# Patient Record
Sex: MALE | Race: WHITE | URBAN - METROPOLITAN AREA
[De-identification: names, ages, dates, MRNs, and addresses within clinical notes are randomized per-mention and may not be internally consistent; named-entity substitution may affect disease eponyms.]

---

## 2017-01-04 DIAGNOSIS — F33.1 MAJOR DEPRESSIVE DISORDER, RECURRENT, MODERATE (H): ICD-10-CM

## 2017-01-04 DIAGNOSIS — E78.5 HYPERLIPIDEMIA LDL GOAL <100: ICD-10-CM

## 2017-01-04 DIAGNOSIS — F41.1 ANXIETY STATE: Primary | ICD-10-CM

## 2017-01-04 RX ORDER — BUPROPION HYDROCHLORIDE 150 MG/1
TABLET ORAL
Qty: 30 TABLET | Refills: 0 | Status: SHIPPED | OUTPATIENT
Start: 2017-01-04 | End: 2017-01-11

## 2017-01-04 RX ORDER — ATORVASTATIN CALCIUM 40 MG/1
TABLET, FILM COATED ORAL
Qty: 30 TABLET | Refills: 0 | Status: SHIPPED | OUTPATIENT
Start: 2017-01-04 | End: 2017-01-11

## 2017-01-04 NOTE — TELEPHONE ENCOUNTER
Spoke with pt; future lab and OV appts scheduled.    Next 5 appointments (look out 90 days)     Jan 11, 2017  2:30 PM   Office Visit with Danika Hargrove MD   [facility] ([facility])    [address] [facility] [id number] [phone]                Medication is being filled for 1 time refill only due to:  upcoming appt   Joy CABALLERO RN

## 2017-01-04 NOTE — TELEPHONE ENCOUNTER
Lipitor      Last Written Prescription Date: 11/04/2016  Last Fill Quantity: 30, # refills: 1  Last Office Visit with [facility], [facility] or  Health prescribing provider: 11/04/2016       CHOL      165   10/28/2016  HDL       51   10/28/2016  LDL       91   10/28/2016  LDL      185   10/2/2013  TRIG      116   10/28/2016  CHOLHDLRATIO      3.8   6/30/2015    Wellbutrin        Last Written Prescription Date: 11/04/2016  Last Fill Quantity: 30; # refills: 1  Last Office Visit with [facility], [facility] or [facility] prescribing provider:  11/04/2016        Last PHQ-9 score on record=   PHQ-9 SCORE 11/4/2016   Total Score -   Total Score 1       AST       48   9/27/2011  ALT       88   9/27/2011

## 2017-01-06 DIAGNOSIS — R63.5 ABNORMAL WEIGHT GAIN: ICD-10-CM

## 2017-01-06 DIAGNOSIS — E78.5 HYPERLIPIDEMIA LDL GOAL <100: ICD-10-CM

## 2017-01-06 LAB
CHOLEST SERPL-MCNC: 170 MG/DL
HDLC SERPL-MCNC: 50 MG/DL
LDLC SERPL CALC-MCNC: 92 MG/DL
NONHDLC SERPL-MCNC: 120 MG/DL
TRIGL SERPL-MCNC: 141 MG/DL

## 2017-01-06 PROCEDURE — 36415 COLL VENOUS BLD VENIPUNCTURE: CPT | Performed by: FAMILY MEDICINE

## 2017-01-06 PROCEDURE — 80061 LIPID PANEL: CPT | Performed by: FAMILY MEDICINE

## 2017-01-11 ENCOUNTER — OFFICE VISIT (OUTPATIENT)
Dept: FAMILY MEDICINE | Facility: CLINIC | Age: 47
End: 2017-01-11
Payer: COMMERCIAL

## 2017-01-11 VITALS
SYSTOLIC BLOOD PRESSURE: 110 MMHG | HEIGHT: 74 IN | TEMPERATURE: 97.2 F | WEIGHT: 245 LBS | HEART RATE: 75 BPM | OXYGEN SATURATION: 96 % | BODY MASS INDEX: 31.44 KG/M2 | DIASTOLIC BLOOD PRESSURE: 60 MMHG

## 2017-01-11 DIAGNOSIS — F41.1 ANXIETY STATE: ICD-10-CM

## 2017-01-11 DIAGNOSIS — F33.1 MAJOR DEPRESSIVE DISORDER, RECURRENT, MODERATE (H): ICD-10-CM

## 2017-01-11 DIAGNOSIS — E78.5 HYPERLIPIDEMIA LDL GOAL <100: Primary | ICD-10-CM

## 2017-01-11 PROCEDURE — 99214 OFFICE O/P EST MOD 30 MIN: CPT | Performed by: FAMILY MEDICINE

## 2017-01-11 RX ORDER — BUPROPION HYDROCHLORIDE 150 MG/1
TABLET ORAL
Qty: 90 TABLET | Refills: 1 | Status: SHIPPED | OUTPATIENT
Start: 2017-01-11 | End: 2017-04-14

## 2017-01-11 RX ORDER — ATORVASTATIN CALCIUM 40 MG/1
TABLET, FILM COATED ORAL
Qty: 90 TABLET | Refills: 1 | Status: SHIPPED | OUTPATIENT
Start: 2017-01-11 | End: 2017-04-14

## 2017-01-11 ASSESSMENT — ANXIETY QUESTIONNAIRES
1. FEELING NERVOUS, ANXIOUS, OR ON EDGE: SEVERAL DAYS
6. BECOMING EASILY ANNOYED OR IRRITABLE: SEVERAL DAYS
5. BEING SO RESTLESS THAT IT IS HARD TO SIT STILL: NOT AT ALL
2. NOT BEING ABLE TO STOP OR CONTROL WORRYING: NOT AT ALL
7. FEELING AFRAID AS IF SOMETHING AWFUL MIGHT HAPPEN: NOT AT ALL
3. WORRYING TOO MUCH ABOUT DIFFERENT THINGS: NOT AT ALL
IF YOU CHECKED OFF ANY PROBLEMS ON THIS QUESTIONNAIRE, HOW DIFFICULT HAVE THESE PROBLEMS MADE IT FOR YOU TO DO YOUR WORK, TAKE CARE OF THINGS AT HOME, OR GET ALONG WITH OTHER PEOPLE: NOT DIFFICULT AT ALL
GAD7 TOTAL SCORE: 2

## 2017-01-11 ASSESSMENT — PATIENT HEALTH QUESTIONNAIRE - PHQ9: 5. POOR APPETITE OR OVEREATING: NOT AT ALL

## 2017-01-11 NOTE — PROGRESS NOTES
SUBJECTIVE:                                                    Rob Beltre is a 46 year old male who presents to clinic today for the following health issues:    Medication Followup of buPROPion 150 mg     Taking Medication as prescribed: yes    Side Effects:  None    Medication Helping Symptoms:  yes     CAD/Lipids-   Increased lipitor to 40mg from 20mg/d based on recent guideline recs, and cardiology had rec >70 LDL initially (though backed off on that rec).  Lipids with minimal to no change with the increased dose, however.  Odd.  Wt is up ~5-6 lbs, but pt thinks it's the scale- hasn't been up on home scale, though hasn't checked after holidays, but clothes fitting the same, feels the same.    Diet changes- eating more fruit.      Recent Labs   Lab Test  01/06/17   0807  10/28/16   1254  06/30/15   1704  02/14/14   0808   CHOL  170  165  169  162   HDL  50  51  45  39*   LDL  92  91  90  94   TRIG  141  116  169*  139   CHOLHDLRATIO   --    --   3.8  4.1        MDD/anxiety- added wellbutrin XL 150mg/d  Pt stopped the citalopram- had intended to have him continue on the citalopram and add wellbutrin XL, with possibility of stopping citalopram later.  With the change to wellbutrin (and off citalopram).  Feels like he's doing 'fine'.  Thinks about the same.  No se's.  Little more irritable, on-edge- attributed it to stress with dealing with family.  Libido is okay, no change- hadn't really felt like it was an issue before or now.  Little more irritable - bit more with wife.    More related to things that were going on with holidays, so unsure if it's more holidays or med change issue.  Will cont to monitor.      Patient Active Problem List   Diagnosis     Allergic rhinitis due to other allergen     Anxiety state     Major depressive disorder, recurrent, moderate (H)     Hyperlipidemia LDL goal <100     GERD (gastroesophageal reflux disease)     CAD (coronary artery disease)     Nonspecific abnormal  electrocardiogram (ECG) (EKG)     Past Surgical History   Procedure Laterality Date     No history of surgery       Cardiac echo ([facility])  7/12     JOHN Peoples, borderline concentric LVH, EF 55%       Social History   Substance Use Topics     Smoking status: Former Smoker     Quit date: 07/01/2012     Smokeless tobacco: Never Used      Comment: stopped after chest pain ER visit in 7/12     Alcohol Use: Yes      Comment: 1-2 drinks every few wks     Family History   Problem Relation Age of Onset     HEART DISEASE Father      MI around age 60     Allergies Father      Depression Mother      Genitourinary Problems Maternal Grandfather      Unsure of what     Gynecology Mother      Hysterectomy     Respiratory Maternal Grandfather      Breathing problems from farming     HEART DISEASE Paternal Grandfather      MI around 65     Lipids Father      Prostate Cancer Father          Current Outpatient Prescriptions   Medication Sig Dispense Refill     atorvastatin (LIPITOR) 40 MG tablet TAKE 1 TABLET (40 MG) BY MOUTH DAILY 90 tablet 1     buPROPion (WELLBUTRIN XL) 150 MG 24 hr tablet TAKE 1 TABLET (150 MG) BY MOUTH EVERY MORNING 90 tablet 1     amLODIPine (NORVASC) 5 MG tablet TAKE ONE TABLET BY MOUTH ONE TIME DAILY 90 tablet 1     aspirin 81 MG tablet Take 1 tablet by mouth daily. 90 tablet 3     Allergies   Allergen Reactions     Dust Mite Extract      Recent Labs   Lab Test  01/06/17   0807  10/28/16   1254  06/30/15   1704   09/27/11   0941  09/22/11   0837   05/14/09   0843   LDL  92  91  90   < >   --   134*   < >  138*   HDL  50  51  45   < >   --   42   < >  42   TRIG  141  116  169*   < >   --   133   < >  148   ALT   --    --    --    --   88*  132*   --   52   CR   --   0.94  1.02   < >   --    --    --    --    GFRESTIMATED   --   86  78   < >   --    --    --    --    GFRESTBLACK   --   >90   GFR Calc    >90   GFR Calc     < >   --    --    --    --    POTASSIUM   --   3.8  3.7   <  ">   --    --    --    --     < > = values in this interval not displayed.      BP Readings from Last 3 Encounters:   01/11/17 110/60   11/04/16 126/70   03/04/16 106/69    Wt Readings from Last 3 Encounters:   01/11/17 245 lb (111.131 kg)   11/04/16 239 lb 1.6 oz (108.455 kg)   03/04/16 238 lb (107.956 kg)                  Labs reviewed in EPIC  Problem list, Medication list, Allergies, and Medical/Social/Surgical histories reviewed in [facility] and updated as appropriate.    ROS:  Constitutional, HEENT, cardiovascular, pulmonary, gi and gu systems are negative, except as otherwise noted.    OBJECTIVE:                                                    /60 mmHg  Pulse 75  Temp(Src) 97.2  F (36.2  C) (Oral)  Ht 6' 1.75\" (1.873 m)  Wt 245 lb (111.131 kg)  BMI 31.68 kg/m2  SpO2 96%  Body mass index is 31.68 kg/(m^2).  GENERAL APPEARANCE: healthy, alert and no distress     EYES: PERRL, sclera clear     HENT: nose and mouth without ulcers or lesions     NECK: no adenopathy, no asymmetry, masses, or scars and thyroid normal to palpation     RESP: lungs clear to auscultation - no rales, rhonchi or wheezes     CV: regular rates and rhythm, normal S1 S2, no S3 or S4 and no murmur, click or rub      Abdomen: soft, nontender, no HSM or masses and bowel sounds normal     Ext: warm, dry, no edema      Psych: full range affect, normal speech and grooming, judgement and insight intact     Diagnostic Test Results:  Results for orders placed or performed in visit on 01/06/17   Lipid Profile with reflex to direct LDL   Result Value Ref Range    Cholesterol 170 <200 mg/dL    Triglycerides 141 <150 mg/dL    HDL Cholesterol 50 >39 mg/dL    LDL Cholesterol Calculated 92 <100 mg/dL    Non HDL Cholesterol 120 <130 mg/dL        ASSESSMENT/PLAN:                                                        ICD-10-CM    1. Hyperlipidemia LDL goal <100 E78.5 Lipid Profile with reflex to direct LDL     atorvastatin (LIPITOR) 40 MG tablet   2. " Major depressive disorder, recurrent, moderate (H) F33.1 buPROPion (WELLBUTRIN XL) 150 MG 24 hr tablet   3. Anxiety state F41.1 buPROPion (WELLBUTRIN XL) 150 MG 24 hr tablet     Lipids- cont 40mg lipitor, recheck fasting lipids prior to 3mo f/u appt- to recheck, odd to see minimal to no difference with dose increase since last time.    Pt will double-check dose on bottle at home as well.    MDD/Anxiety- feeling pretty much the same with the change from citalopram to wellbutrin - intended slower transition, but he did fine.  Switch mostly due to the wt gain, unsure if related to citalopram or not.  Now with mild wt gain on wellbutrin based on clinic scale. No se's other than possibly increased irritability.  Will cont to monitor sx's, rtc in 3 months to f/u.      Danika Hargrove MD  [facility]

## 2017-01-12 ASSESSMENT — ANXIETY QUESTIONNAIRES: GAD7 TOTAL SCORE: 2

## 2017-01-12 ASSESSMENT — PATIENT HEALTH QUESTIONNAIRE - PHQ9: SUM OF ALL RESPONSES TO PHQ QUESTIONS 1-9: 0

## 2017-03-26 DIAGNOSIS — F33.1 MAJOR DEPRESSIVE DISORDER, RECURRENT, MODERATE (H): ICD-10-CM

## 2017-03-26 DIAGNOSIS — F41.1 ANXIETY STATE: ICD-10-CM

## 2017-03-26 DIAGNOSIS — I25.10 CORONARY ARTERY DISEASE INVOLVING NATIVE CORONARY ARTERY OF NATIVE HEART WITHOUT ANGINA PECTORIS: ICD-10-CM

## 2017-03-26 DIAGNOSIS — E78.5 HYPERLIPIDEMIA LDL GOAL <100: ICD-10-CM

## 2017-03-27 RX ORDER — BUPROPION HYDROCHLORIDE 150 MG/1
TABLET ORAL
Start: 2017-03-27

## 2017-03-27 RX ORDER — ATORVASTATIN CALCIUM 40 MG/1
TABLET, FILM COATED ORAL
Start: 2017-03-27

## 2017-03-27 RX ORDER — AMLODIPINE BESYLATE 5 MG/1
TABLET ORAL
Start: 2017-03-27

## 2017-03-27 NOTE — TELEPHONE ENCOUNTER
Wellbutrin        Last Written Prescription Date: 01/11/2017  Last Fill Quantity: 90; # refills: 1  Last Office Visit with [facility] or  Health prescribing provider:  01/11/2017        Last PHQ-9 score on record=   PHQ-9 SCORE 1/11/2017   Total Score -   Total Score 0       Lab Results   Component Value Date    AST 48 09/27/2011     Lab Results   Component Value Date    ALT 88 09/27/2011     Lipitor      Last Written Prescription Date: 01/11/2017  Last Fill Quantity: 90, # refills: 1  Last Office Visit with [facility] or  Health prescribing provider: 01/11/2017       Lab Results   Component Value Date    CHOL 170 01/06/2017     Lab Results   Component Value Date    HDL 50 01/06/2017     Lab Results   Component Value Date    LDL 92 01/06/2017     Lab Results   Component Value Date    TRIG 141 01/06/2017     Lab Results   Component Value Date    CHOLHDLRATIO 3.8 06/30/2015     Norvasc       Last Written Prescription Date: 11/16/2016  Last Fill Quantity: 90, # refills: 1  Last Office Visit with [facility] or  Health prescribing provider: 01/11/2017       Potassium   Date Value Ref Range Status   10/28/2016 3.8 3.4 - 5.3 mmol/L Final     Creatinine   Date Value Ref Range Status   10/28/2016 0.94 0.66 - 1.25 mg/dL Final     BP Readings from Last 3 Encounters:   01/11/17 110/60   11/04/16 126/70   03/04/16 106/69

## 2017-04-07 DIAGNOSIS — E78.5 HYPERLIPIDEMIA LDL GOAL <100: ICD-10-CM

## 2017-04-07 PROCEDURE — 36415 COLL VENOUS BLD VENIPUNCTURE: CPT | Performed by: FAMILY MEDICINE

## 2017-04-07 PROCEDURE — 80061 LIPID PANEL: CPT | Performed by: FAMILY MEDICINE

## 2017-04-10 LAB
CHOLEST SERPL-MCNC: 136 MG/DL
HDLC SERPL-MCNC: 54 MG/DL
LDLC SERPL CALC-MCNC: 61 MG/DL
NONHDLC SERPL-MCNC: 82 MG/DL
TRIGL SERPL-MCNC: 105 MG/DL

## 2017-04-14 ENCOUNTER — OFFICE VISIT (OUTPATIENT)
Dept: FAMILY MEDICINE | Facility: CLINIC | Age: 47
End: 2017-04-14
Payer: COMMERCIAL

## 2017-04-14 VITALS
HEART RATE: 61 BPM | TEMPERATURE: 98.6 F | BODY MASS INDEX: 28.83 KG/M2 | HEIGHT: 74 IN | WEIGHT: 224.6 LBS | SYSTOLIC BLOOD PRESSURE: 101 MMHG | DIASTOLIC BLOOD PRESSURE: 66 MMHG | OXYGEN SATURATION: 96 %

## 2017-04-14 DIAGNOSIS — F33.1 MAJOR DEPRESSIVE DISORDER, RECURRENT, MODERATE (H): ICD-10-CM

## 2017-04-14 DIAGNOSIS — E78.5 HYPERLIPIDEMIA LDL GOAL <70: ICD-10-CM

## 2017-04-14 DIAGNOSIS — E78.5 HYPERLIPIDEMIA LDL GOAL <100: Primary | ICD-10-CM

## 2017-04-14 DIAGNOSIS — F41.1 ANXIETY STATE: ICD-10-CM

## 2017-04-14 DIAGNOSIS — I25.10 CORONARY ARTERY DISEASE INVOLVING NATIVE CORONARY ARTERY OF NATIVE HEART WITHOUT ANGINA PECTORIS: ICD-10-CM

## 2017-04-14 PROCEDURE — 99214 OFFICE O/P EST MOD 30 MIN: CPT | Performed by: FAMILY MEDICINE

## 2017-04-14 RX ORDER — ATORVASTATIN CALCIUM 40 MG/1
TABLET, FILM COATED ORAL
Qty: 90 TABLET | Refills: 1 | Status: SHIPPED | OUTPATIENT
Start: 2017-04-14 | End: 2017-11-19

## 2017-04-14 RX ORDER — AMLODIPINE BESYLATE 5 MG/1
5 TABLET ORAL DAILY
Qty: 90 TABLET | Refills: 1 | Status: SHIPPED | OUTPATIENT
Start: 2017-04-14 | End: 2017-11-19

## 2017-04-14 RX ORDER — BUPROPION HYDROCHLORIDE 150 MG/1
TABLET ORAL
Qty: 90 TABLET | Refills: 1 | Status: SHIPPED | OUTPATIENT
Start: 2017-04-14 | End: 2017-11-19

## 2017-04-14 ASSESSMENT — ANXIETY QUESTIONNAIRES
6. BECOMING EASILY ANNOYED OR IRRITABLE: NOT AT ALL
IF YOU CHECKED OFF ANY PROBLEMS ON THIS QUESTIONNAIRE, HOW DIFFICULT HAVE THESE PROBLEMS MADE IT FOR YOU TO DO YOUR WORK, TAKE CARE OF THINGS AT HOME, OR GET ALONG WITH OTHER PEOPLE: NOT DIFFICULT AT ALL
1. FEELING NERVOUS, ANXIOUS, OR ON EDGE: NOT AT ALL
GAD7 TOTAL SCORE: 0
5. BEING SO RESTLESS THAT IT IS HARD TO SIT STILL: NOT AT ALL
2. NOT BEING ABLE TO STOP OR CONTROL WORRYING: NOT AT ALL
3. WORRYING TOO MUCH ABOUT DIFFERENT THINGS: NOT AT ALL
7. FEELING AFRAID AS IF SOMETHING AWFUL MIGHT HAPPEN: NOT AT ALL

## 2017-04-14 ASSESSMENT — PATIENT HEALTH QUESTIONNAIRE - PHQ9: 5. POOR APPETITE OR OVEREATING: NOT AT ALL

## 2017-04-14 NOTE — MR AVS SNAPSHOT
"              After Visit Summary   4/14/2017    Rob Beltre    MRN: [id number]           Patient Information     Date Of Birth          1970        Visit Information        Provider Department      4/14/2017 2:00 PM Danika Hargrove MD [facility]        Today's Diagnoses     Hyperlipidemia LDL goal <100    -  1    Anxiety state        Major depressive disorder, recurrent, moderate (H)        Coronary artery disease involving native coronary artery of native heart without angina pectoris        Hyperlipidemia LDL goal <70          Care Instructions    7-minute work out.  Headspace meditation girish?        Follow-ups after your visit        Who to contact     If you have questions or need follow up information about today's clinic visit or your schedule please contact [facility] directly at [phone].  Normal or non-critical lab and imaging results will be communicated to you by MyChart, letter or phone within 4 business days after the clinic has received the results. If you do not hear from us within 7 days, please contact the clinic through [facility] or phone. If you have a critical or abnormal lab result, we will notify you by phone as soon as possible.  Submit refill requests through [employer] or call your pharmacy and they will forward the refill request to us. Please allow 3 business days for your refill to be completed.          Additional Information About Your Visit        MyChart Information     [employer] lets you send messages to your doctor, view your test results, renew your prescriptions, schedule appointments and more. To sign up, go to [email or web address] . Click on \"Log in\" on the left side of the screen, which will take you to the Welcome page. Then click on \"Sign up Now\" on the right side of the page.     You will be asked to enter the access code listed below, as well as some personal information. Please follow the directions to create your username and " "password.     Your access code is: [email or web address]  Expires: 2017  3:26 PM     Your access code will  in 90 days. If you need help or a new code, please call your [facility] or [phone].        Care EveryWhere ID     This is your Care EveryWhere ID. This could be used by other organizations to access your [facility] medical records  [id number]        Your Vitals Were     Pulse Temperature Height Pulse Oximetry BMI (Body Mass Index)       61 98.6  F (37  C) (Oral) 6' 1.75\" (1.873 m) 96% 29.03 kg/m2        Blood Pressure from Last 3 Encounters:   17 101/66   17 110/60   16 126/70    Weight from Last 3 Encounters:   17 224 lb 9.6 oz (101.9 kg)   17 245 lb (111.1 kg)   16 239 lb 1.6 oz (108.5 kg)              Today, you had the following     No orders found for display         Today's Medication Changes          These changes are accurate as of: 17 11:59 PM.  If you have any questions, ask your nurse or doctor.               These medicines have changed or have updated prescriptions.        Dose/Directions    amLODIPine 5 MG tablet   Commonly known as:  NORVASC   This may have changed:  See the new instructions.   Used for:  Coronary artery disease involving native coronary artery of native heart without angina pectoris   Changed by:  Danika Hargrove MD        Dose:  5 mg   Take 1 tablet (5 mg) by mouth daily   Quantity:  90 tablet   Refills:  1            Where to get your medicines      These medications were sent to [place] [address] [place] [facility] [id number] [place] [address]  [id number] [place] [address]     Phone:  [phone]     amLODIPine 5 MG tablet    atorvastatin 40 MG tablet    buPROPion 150 MG 24 hr tablet                Primary Care Provider Office Phone # Fax #    Danika Hargrove [phone]       [facility] [address] [address]        Thank you!     Thank you for " choosing [facility]  for your care. Our goal is always to provide you with excellent care. Hearing back from our patients is one way we can continue to improve our services. Please take a few minutes to complete the written survey that you may receive in the mail after your visit with us. Thank you!             Your Updated Medication List - Protect others around you: Learn how to safely use, store and throw away your medicines at www.disposemymeds.org.          This list is accurate as of: 4/14/17 11:59 PM.  Always use your most recent med list.                   Brand Name Dispense Instructions for use    amLODIPine 5 MG tablet    NORVASC    90 tablet    Take 1 tablet (5 mg) by mouth daily       aspirin 81 MG tablet     90 tablet    Take 1 tablet by mouth daily.       atorvastatin 40 MG tablet    LIPITOR    90 tablet    TAKE 1 TABLET (40 MG) BY MOUTH DAILY       buPROPion 150 MG 24 hr tablet    WELLBUTRIN XL    90 tablet    TAKE 1 TABLET (150 MG) BY MOUTH EVERY MORNING

## 2017-04-14 NOTE — PROGRESS NOTES
SUBJECTIVE:                                                    Rob Beltre is a 46 year old male who presents to clinic today for the following health issues:    Hyperlipidemia Follow-Up      Rate your low fat/cholesterol diet?: good    Taking statin?  Yes, no muscle aches from statin    Other lipid medications/supplements?:  none     Depression Followup    Status since last visit: Good    See PHQ-9 for current symptoms.  Other associated symptoms: None    Complicating factors:   Significant life event:  No   Current substance abuse:  None  Anxiety or Panic symptoms:  No    PHQ-9  English PHQ-9   Any Language          Amount of exercise or physical activity: Couple days-half hour to 45 mins    Problems taking medications regularly: No    Medication side effects: none    Diet: regular (no restrictions)    Back in 1/17, increased lipitor from 20mg to 40mg, but the LDL didn't change at all, which we discussed was odd at his last appt.  No meds change after last visit, but pt motivated to make significant changes in diet/exercise/wt.    Weight-   Lost ~20 lbs!  Down to 224 lbs- goal is to get down to 200 lbs.  Stopped eating sweets almost all together- special occasion only.  Really cut down on bread- ~1x/wk.  No soda  Lots of chicken, fish, salads, vegetables and fruits.  Snacks- almonds or clementines.  Humus with carrots and snow peas.  Not as many grains.    Over the last 3-4 wks, they started [employer] 3x/wk- more grains in that.  Haven't figured out how to pick meals they really want.  Portions are big- usually wife eats the left-overs.  One bad meal a week- likes [employer] food- gets naan and rice.  Mexican food-     Summer- wants to lose more, getting more active.      Mood- Just on the wellbutrin XL for the mood.  Compared to the citalopram- doing well for the depression sx's.  Feels more emotional, closer to the surface, embarrassing more than anything.  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    Vit D-  forgot to start.    Exercise- some walking now.      Last 12/15, hit left ankle with shoe, and still swollen and discolored.  Rec exercises.    Problem list and histories reviewed & adjusted, as indicated.  Additional history: as documented    Patient Active Problem List   Diagnosis     Allergic rhinitis due to other allergen     Anxiety state     Major depressive disorder, recurrent, moderate (H)     Hyperlipidemia LDL goal <100     GERD (gastroesophageal reflux disease)     CAD (coronary artery disease)     Nonspecific abnormal electrocardiogram (ECG) (EKG)     Past Surgical History:   Procedure Laterality Date     CARDIAC ECHO ([facility])  7/12    JOHN Peoples, borderline concentric LVH, EF 55%     NO HISTORY OF SURGERY         Social History   Substance Use Topics     Smoking status: Former Smoker     Quit date: 7/1/2012     Smokeless tobacco: Never Used      Comment: stopped after chest pain ER visit in 7/12     Alcohol use Yes      Comment: 1-2 drinks every few wks     Family History   Problem Relation Age of Onset     HEART DISEASE Father      MI around age 60     Allergies Father      Depression Mother      Genitourinary Problems Maternal Grandfather      Unsure of what     Gynecology Mother      Hysterectomy     Respiratory Maternal Grandfather      Breathing problems from farming     HEART DISEASE Paternal Grandfather      MI around 65     Lipids Father      Prostate Cancer Father          Current Outpatient Prescriptions   Medication Sig Dispense Refill     atorvastatin (LIPITOR) 40 MG tablet TAKE 1 TABLET (40 MG) BY MOUTH DAILY 90 tablet 1     buPROPion (WELLBUTRIN XL) 150 MG 24 hr tablet TAKE 1 TABLET (150 MG) BY MOUTH EVERY MORNING 90 tablet 1     amLODIPine (NORVASC) 5 MG tablet Take 1 tablet (5 mg) by mouth daily 90 tablet 1     aspirin 81 MG tablet Take 1 tablet by mouth daily. 90 tablet 3     Allergies   Allergen Reactions     Dust Mite Extract      Recent Labs   Lab Test  04/07/17   1548  01/06/17    "0807  10/28/16   1254  06/30/15   1704   09/27/11   0941  09/22/11   0837   05/14/09   0843   LDL  61  92  91  90   < >   --   134*   < >  138*   HDL  54  50  51  45   < >   --   42   < >  42   TRIG  105  141  116  169*   < >   --   133   < >  148   ALT   --    --    --    --    --   88*  132*   --   52   CR   --    --   0.94  1.02   < >   --    --    --    --    GFRESTIMATED   --    --   86  78   < >   --    --    --    --    GFRESTBLACK   --    --   >90   GFR Calc    >90   GFR Calc     < >   --    --    --    --    POTASSIUM   --    --   3.8  3.7   < >   --    --    --    --     < > = values in this interval not displayed.      BP Readings from Last 3 Encounters:   04/14/17 101/66   01/11/17 110/60   11/04/16 126/70    Wt Readings from Last 3 Encounters:   04/14/17 224 lb 9.6 oz (101.9 kg)   01/11/17 245 lb (111.1 kg)   11/04/16 239 lb 1.6 oz (108.5 kg)               Labs reviewed in EPIC    Reviewed and updated as needed this visit by clinical staff  Tobacco  Allergies  Meds  Problems       Reviewed and updated as needed this visit by Provider  Allergies  Meds  Problems         ROS:  Constitutional, HEENT, cardiovascular, pulmonary, gi and gu systems are negative, except as otherwise noted.    OBJECTIVE:                                                    /66  Pulse 61  Temp 98.6  F (37  C) (Oral)  Ht 6' 1.75\" (1.873 m)  Wt 224 lb 9.6 oz (101.9 kg)  SpO2 96%  BMI 29.03 kg/m2  Body mass index is 29.03 kg/(m^2).  GENERAL APPEARANCE: healthy, alert and no distress     EYES: sclera clear, EOMI     RESP: lungs clear to auscultation - no rales, rhonchi or wheezes     CV: regular rates and rhythm, normal S1 S2, no S3 or S4 and no murmur, click or rub      Ext: warm, dry, no edema       Diagnostic Test Results:  none      ASSESSMENT/PLAN:                                                        ICD-10-CM    1. Hyperlipidemia LDL goal <100 E78.5 atorvastatin (LIPITOR) 40 MG " tablet   2. Anxiety state F41.1 buPROPion (WELLBUTRIN XL) 150 MG 24 hr tablet   3. Major depressive disorder, recurrent, moderate (H) F33.1 buPROPion (WELLBUTRIN XL) 150 MG 24 hr tablet   4. Coronary artery disease involving native coronary artery of native heart without angina pectoris I25.10 amLODIPine (NORVASC) 5 MG tablet     Lipids/CAD-   Pt doing GREAT with diet/exercise/wt loss after getting motivated with lack of improvement in LDL at last visit despite increase in lipitor.  Really working on diet/exercise.  Now still on lipitor 40mg/d, but with the wt loss and improved habits, LDL fell from the 90s to 61 (LDL goal <100).  Commended him on his great work.  He still wants to lose more - hoping to do so with better activity this summer.  Also rec 7-minute work-out.    MDD- wellbutrin XL working well, except that he feels more emotional, closer to surface, tearing up a bit more.  Would like to keep meds the same for now, though.  Had meant to keep him on both the citalopram and add the wellbutrin at his 1/17 visit, but pt thought he was to stop the citalopram and do the wellbutrin alone.  Will consider adding back the citalopram if needed in future.    Meds sent x 6mo- f/u with physical at that time.      Danika Hargrove MD  [facility]

## 2017-04-15 ASSESSMENT — ANXIETY QUESTIONNAIRES: GAD7 TOTAL SCORE: 0

## 2017-04-15 ASSESSMENT — PATIENT HEALTH QUESTIONNAIRE - PHQ9: SUM OF ALL RESPONSES TO PHQ QUESTIONS 1-9: 0

## 2017-09-13 ENCOUNTER — OFFICE VISIT (OUTPATIENT)
Dept: FAMILY MEDICINE | Facility: CLINIC | Age: 47
End: 2017-09-13
Payer: COMMERCIAL

## 2017-09-13 VITALS
HEIGHT: 74 IN | TEMPERATURE: 98 F | BODY MASS INDEX: 27.53 KG/M2 | HEART RATE: 64 BPM | SYSTOLIC BLOOD PRESSURE: 114 MMHG | WEIGHT: 214.5 LBS | DIASTOLIC BLOOD PRESSURE: 68 MMHG | RESPIRATION RATE: 16 BRPM

## 2017-09-13 DIAGNOSIS — M54.50 ACUTE RIGHT-SIDED LOW BACK PAIN WITHOUT SCIATICA: Primary | ICD-10-CM

## 2017-09-13 PROCEDURE — 99214 OFFICE O/P EST MOD 30 MIN: CPT | Performed by: FAMILY MEDICINE

## 2017-09-13 RX ORDER — CYCLOBENZAPRINE HCL 10 MG
5-10 TABLET ORAL 3 TIMES DAILY PRN
Qty: 30 TABLET | Refills: 1 | Status: SHIPPED | OUTPATIENT
Start: 2017-09-13 | End: 2022-03-04

## 2017-09-13 NOTE — NURSING NOTE
"Chief Complaint   Patient presents with     Back Pain     /68  Pulse 64  Temp 98  F (36.7  C) (Oral)  Resp 16  Ht 6' 1.75\" (1.873 m)  Wt 214 lb 8 oz (97.3 kg)  BMI 27.73 kg/m2 Estimated body mass index is 27.73 kg/(m^2) as calculated from the following:    Height as of this encounter: 6' 1.75\" (1.873 m).    Weight as of this encounter: 214 lb 8 oz (97.3 kg).  Medication Reconciliation: complete      Health Maintenance due pending provider review:  NONE    n/a    Kym Guerrero CMA  "

## 2017-09-13 NOTE — PROGRESS NOTES
SUBJECTIVE:   Rob Beltre is a 46 year old male who presents to clinic today for the following health issues:    Back Pain     Duration: intermittent spasms on lower back, ~4 days ago        Specific cause: none    Description:   Location of pain: low back all over  Character of pain: cramping and tightening, worse with more walking  Pain radiation: radiated up into whole back  New numbness or weakness in legs, not attributed to pain:  YES- when it really got tight    Intensity: moderate    History:   Pain interferes with job: not really, but still present  History of back problems: no prior back problems, few incidents of some tightening previously  Any previous MRI or X-rays: None  Sees a specialist for back pain:  No  Therapies tried without relief: rest and NSAIDs, but took longer than usual to subside    Alleviating factors:   Improved by: rest and NSAIDs, heat     Precipitating factors:  Worsened by: unknown    Functional and Psychosocial Screen (Margareth STarT Back):      Not performed today      Accompanying Signs & Symptoms:  Risk of Fracture:  None  Risk of Cauda Equina:  None  Risk of Infection:  None  Risk of Cancer:  None  Risk of Ankylosing Spondylitis:  Onset at age <35, male, AND morning back stiffness. no     Has gotten intermittent spasms on lower back.    ~4 days ago, while walking at an [employer], low back started tightening up.  Kept getting worse and worse, to the point he couldn't move his leg.  At [employer], walking, kept getting worse.  Eventually had to leave.  While driving home, his back tightened up so badly he had to pull over and he couldn't move for awhile.  After 5-10 minutes, was able to get out and into back seat.  Sat in care leaning to the right -most comfortable position.  Got home- got into house, took advil, lied down on floor, tried stretching.  Started to feel better.  Lied around for rest of the day.      Started to feel better in back of car and after resting at home, but  never went away.  Still there at the lower level of pain- consistent, can always feel it.  Sx's staying about the same.  Can go to work, look behind shoulder.  If sitting longer, starts feeling it as well in upper back.  Also feels tight in the rest of his body as well.    Taking advil and applying heat to back seems to help- heated seats, shower, lying on floor to stretch helps as well.    Has had back pain in past, but not this bad, and always improved after a day or two.      Mostly worried because he and his wife are going to [place] in a week-   His wife is really worried what she'll be able to do for him if sx's are bad there.      Problem list and histories reviewed & adjusted, as indicated.  Additional history: as documented    Patient Active Problem List   Diagnosis     Allergic rhinitis due to other allergen     Anxiety state     Major depressive disorder, recurrent, moderate (H)     Hyperlipidemia LDL goal <100     GERD (gastroesophageal reflux disease)     CAD (coronary artery disease)     Nonspecific abnormal electrocardiogram (ECG) (EKG)      Current Outpatient Prescriptions   Medication Sig Dispense Refill     cyclobenzaprine (FLEXERIL) 10 MG tablet Take 0.5-1 tablets (5-10 mg) by mouth 3 times daily as needed for muscle spasms 30 tablet 1     atorvastatin (LIPITOR) 40 MG tablet TAKE 1 TABLET (40 MG) BY MOUTH DAILY 90 tablet 1     buPROPion (WELLBUTRIN XL) 150 MG 24 hr tablet TAKE 1 TABLET (150 MG) BY MOUTH EVERY MORNING 90 tablet 1     amLODIPine (NORVASC) 5 MG tablet Take 1 tablet (5 mg) by mouth daily 90 tablet 1     aspirin 81 MG tablet Take 1 tablet by mouth daily. 90 tablet 3     Allergies   Allergen Reactions     Dust Mite Extract      BP Readings from Last 3 Encounters:   09/13/17 114/68   04/14/17 101/66   01/11/17 110/60    Wt Readings from Last 3 Encounters:   09/13/17 214 lb 8 oz (97.3 kg)   04/14/17 224 lb 9.6 oz (101.9 kg)   01/11/17 245 lb (111.1 kg)           Labs reviewed in  "EPIC      Reviewed and updated as needed this visit by clinical staff  Tobacco  Allergies  Meds  Problems  Med Hx  Surg Hx  Fam Hx  Soc Hx        Reviewed and updated as needed this visit by Provider  Allergies  Meds  Problems       ROS:  Constitutional, HEENT, cardiovascular, pulmonary, gi and gu systems are negative, except as otherwise noted.      OBJECTIVE:   /68  Pulse 64  Temp 98  F (36.7  C) (Oral)  Resp 16  Ht 6' 1.75\" (1.873 m)  Wt 214 lb 8 oz (97.3 kg)  BMI 27.73 kg/m2  Body mass index is 27.73 kg/(m^2).   GENERAL APPEARANCE: healthy, alert and no distress     EYES: sclera clear, EOMI     RESP: lungs clear to auscultation - no rales, rhonchi or wheezes     CV: regular rates and rhythm, normal S1 S2, no S3 or S4 and no murmur, click or rub      Ext: warm, dry, no edema    Comprehensive back pain exam:  Tenderness of right lumbar paraspinous muscles, Range of motion not limited by pain, Lower extremity strength functional and equal on both sides, Lower extremity reflexes within normal limits bilaterally, Lower extremity sensation normal and equal on both sides and Straight leg raise negative bilaterally    Diagnostic Test Results:  none     ASSESSMENT/PLAN:         ICD-10-CM    1. Acute right-sided low back pain without sciatica M54.5 cyclobenzaprine (FLEXERIL) 10 MG tablet     45yo pt with occasional mild low back muscle spasm flares, now with his worst.  Sx's improved after 1-2 hrs, but has had persistent mild pain for a few days since.  Worried about upcoming flight/trip to [place].  Will do flexeril in case of worsening there.  Risks and benefits of medication(s) including potential side effects reviewed with patient.  Questions answered.   Also discussed alternating ibuprofen/tylenol for pain relief.  Call for PT if sx's persisted.  Pt agrees with plan.    Danika Hargrove MD  [facility]    "

## 2017-09-13 NOTE — MR AVS SNAPSHOT
"              After Visit Summary   2017    Rob Beltre    MRN: [id number]           Patient Information     Date Of Birth          1970        Visit Information        Provider Department      2017 10:30 AM Danika Hargrove MD [facility]        Today's Diagnoses     Acute right-sided low back pain without sciatica    -  1       Follow-ups after your visit        Who to contact     If you have questions or need follow up information about today's clinic visit or your schedule please contact [facility] directly at [phone].  Normal or non-critical lab and imaging results will be communicated to you by [employer], letter or phone within 4 business days after the clinic has received the results. If you do not hear from us within 7 days, please contact the clinic through [employer] or phone. If you have a critical or abnormal lab result, we will notify you by phone as soon as possible.  Submit refill requests through [employer] or call your pharmacy and they will forward the refill request to us. Please allow 3 business days for your refill to be completed.          Additional Information About Your Visit        MyChart Information     [employer] lets you send messages to your doctor, view your test results, renew your prescriptions, schedule appointments and more. To sign up, go to [email or web address] . Click on \"Log in\" on the left side of the screen, which will take you to the Welcome page. Then click on \"Sign up Now\" on the right side of the page.     You will be asked to enter the access code listed below, as well as some personal information. Please follow the directions to create your username and password.     Your access code is: [email or web address]  Expires: 2017  2:42 PM     Your access code will  in 90 days. If you need help or a new code, please call your [facility] or [phone].        Care EveryWhere ID     This is your Care EveryWhere ID. This " "could be used by other organizations to access your [facility] medical records  [id number]        Your Vitals Were     Pulse Temperature Respirations Height BMI (Body Mass Index)       64 98  F (36.7  C) (Oral) 16 6' 1.75\" (1.873 m) 27.73 kg/m2        Blood Pressure from Last 3 Encounters:   09/13/17 114/68   04/14/17 101/66   01/11/17 110/60    Weight from Last 3 Encounters:   09/13/17 214 lb 8 oz (97.3 kg)   04/14/17 224 lb 9.6 oz (101.9 kg)   01/11/17 245 lb (111.1 kg)              Today, you had the following     No orders found for display         Today's Medication Changes          These changes are accurate as of: 9/13/17 11:59 PM.  If you have any questions, ask your nurse or doctor.               Start taking these medicines.        Dose/Directions    cyclobenzaprine 10 MG tablet   Commonly known as:  FLEXERIL   Used for:  Acute right-sided low back pain without sciatica   Started by:  Danika Hargrove MD        Dose:  5-10 mg   Take 0.5-1 tablets (5-10 mg) by mouth 3 times daily as needed for muscle spasms   Quantity:  30 tablet   Refills:  1            Where to get your medicines      These medications were sent to [facility] [id number] [place], [place] - [address]  [address] [facility] [address]     Phone:  [phone]     cyclobenzaprine 10 MG tablet                Primary Care Provider Office Phone # Fax #    Danika Hargrove [phone] [address] [address]        Equal Access to Services     [facility] : Hadchetan Alvarado, richie gutierrez, qaybcara kaalkwame dillon. So [facility] [phone].    ATENCIÓN: Si habla español, tiene a conte disposición servicios gratuitos de asistencia lingüística. Llame al [phone].    We comply with applicable federal civil rights laws and [place] laws. We do not discriminate on the basis of race, color, national origin, age, " disability sex, sexual orientation or gender identity.            Thank you!     Thank you for choosing [facility]  for your care. Our goal is always to provide you with excellent care. Hearing back from our patients is one way we can continue to improve our services. Please take a few minutes to complete the written survey that you may receive in the mail after your visit with us. Thank you!             Your Updated Medication List - Protect others around you: Learn how to safely use, store and throw away your medicines at www.disposemymeds.org.          This list is accurate as of: 9/13/17 11:59 PM.  Always use your most recent med list.                   Brand Name Dispense Instructions for use Diagnosis    amLODIPine 5 MG tablet    NORVASC    90 tablet    Take 1 tablet (5 mg) by mouth daily    Coronary artery disease involving native coronary artery of native heart without angina pectoris       aspirin 81 MG tablet     90 tablet    Take 1 tablet by mouth daily.    CAD (coronary artery disease)       atorvastatin 40 MG tablet    LIPITOR    90 tablet    TAKE 1 TABLET (40 MG) BY MOUTH DAILY    Hyperlipidemia LDL goal <100       buPROPion 150 MG 24 hr tablet    WELLBUTRIN XL    90 tablet    TAKE 1 TABLET (150 MG) BY MOUTH EVERY MORNING    Anxiety state, Major depressive disorder, recurrent, moderate (H)       cyclobenzaprine 10 MG tablet    FLEXERIL    30 tablet    Take 0.5-1 tablets (5-10 mg) by mouth 3 times daily as needed for muscle spasms    Acute right-sided low back pain without sciatica

## 2017-11-19 DIAGNOSIS — I25.10 CORONARY ARTERY DISEASE INVOLVING NATIVE CORONARY ARTERY OF NATIVE HEART WITHOUT ANGINA PECTORIS: ICD-10-CM

## 2017-11-19 DIAGNOSIS — E78.5 HYPERLIPIDEMIA LDL GOAL <100: ICD-10-CM

## 2017-11-19 DIAGNOSIS — F41.1 ANXIETY STATE: ICD-10-CM

## 2017-11-19 DIAGNOSIS — F33.1 MAJOR DEPRESSIVE DISORDER, RECURRENT, MODERATE (H): ICD-10-CM

## 2017-11-20 RX ORDER — ATORVASTATIN CALCIUM 40 MG/1
TABLET, FILM COATED ORAL
Qty: 30 TABLET | Refills: 0 | Status: SHIPPED | OUTPATIENT
Start: 2017-11-20 | End: 2017-12-13

## 2017-11-20 RX ORDER — BUPROPION HYDROCHLORIDE 150 MG/1
TABLET ORAL
Qty: 30 TABLET | Refills: 0 | Status: SHIPPED | OUTPATIENT
Start: 2017-11-20 | End: 2017-12-13

## 2017-11-20 RX ORDER — AMLODIPINE BESYLATE 5 MG/1
TABLET ORAL
Qty: 30 TABLET | Refills: 0 | Status: SHIPPED | OUTPATIENT
Start: 2017-11-20 | End: 2017-12-13

## 2017-11-20 NOTE — TELEPHONE ENCOUNTER
Medication is being filled for 1 time refill only due to:  Patient needs to be seen because Due for physical.   Note from 4/14/17 OV:  Meds sent x 6mo- f/u with physical at that time.  Marion Collins RN    Wellbutrin       Last Written Prescription Date: 4/14/2017  Last Fill Quantity: 90; # refills: 1  Last Office Visit with [facility] or [place] prescribing provider:  4/14/2017        Last PHQ-9 score on record=   PHQ-9 SCORE 4/14/2017   Total Score -   Total Score 0       Lab Results   Component Value Date    AST 48 09/27/2011     Lab Results   Component Value Date    ALT 88 09/27/2011               Norvasc     Last Written Prescription Date: 4/14/2017  Last Fill Quantity: 90, # refills: 1  Last Office Visit with [facility] or [facility] prescribing provider: 4/14/2017       Potassium   Date Value Ref Range Status   10/28/2016 3.8 3.4 - 5.3 mmol/L Final     Creatinine   Date Value Ref Range Status   10/28/2016 0.94 0.66 - 1.25 mg/dL Final     BP Readings from Last 3 Encounters:   09/13/17 114/68   04/14/17 101/66   01/11/17 110/60         Atorvastatin     Last Written Prescription Date: 4/14/2017  Last Fill Quantity: 90, # refills: 1  Last Office Visit with [facility], [facility] or [facility] prescribing provider: 4/14/2017       Lab Results   Component Value Date    CHOL 136 04/07/2017     Lab Results   Component Value Date    HDL 54 04/07/2017     Lab Results   Component Value Date    LDL 61 04/07/2017     Lab Results   Component Value Date    TRIG 105 04/07/2017     Lab Results   Component Value Date    CHOLHDLRATIO 3.8 06/30/2015

## 2017-12-01 DIAGNOSIS — F33.1 MAJOR DEPRESSIVE DISORDER, RECURRENT, MODERATE (H): ICD-10-CM

## 2017-12-01 DIAGNOSIS — F41.1 ANXIETY STATE: ICD-10-CM

## 2017-12-01 DIAGNOSIS — I25.10 CORONARY ARTERY DISEASE INVOLVING NATIVE CORONARY ARTERY OF NATIVE HEART WITHOUT ANGINA PECTORIS: ICD-10-CM

## 2017-12-04 RX ORDER — BUPROPION HYDROCHLORIDE 150 MG/1
TABLET ORAL
Start: 2017-12-04

## 2017-12-04 RX ORDER — AMLODIPINE BESYLATE 5 MG/1
TABLET ORAL
Start: 2017-12-04

## 2017-12-04 NOTE — TELEPHONE ENCOUNTER
Denied both requests  Refill requests too early; Rx sent 11/20/17 for 30 days  Joy CABALLERO RN      Requested Prescriptions   Pending Prescriptions Disp Refills     amLODIPine (NORVASC) 5 MG tablet [Pharmacy Med Name: AMLODIPINE BESYLATE 5 MG TAB] 90 tablet 1     Sig: TAKE 1 TABLET (5 MG) BY MOUTH DAILY    Calcium Channel Blockers Protocol  Failed    12/4/2017  9:46 AM       Failed - Normal serum creatinine on file in past 12 months    Recent Labs   Lab Test  10/28/16   1254   CR  0.94            Passed - Blood pressure under 140/90    BP Readings from Last 3 Encounters:   09/13/17 114/68   04/14/17 101/66   01/11/17 110/60                Passed - Recent or future visit with authorizing provider    Patient had office visit in the last year or has a visit in the next 30 days with authorizing provider.  See chart review.              Passed - Patient is age 18 or older        buPROPion (WELLBUTRIN XL) 150 MG 24 hr tablet [Pharmacy Med Name: BUPROPION HCL  MG TABLET] 90 tablet 1     Sig: TAKE 1 TABLET (150 MG) BY MOUTH EVERY MORNING    SSRIs Protocol Failed    12/4/2017  9:46 AM       Failed - PHQ-9 score less than 5 in past 6 months    Please review PHQ-9 score.          Failed - Medication is NOT Bupropion    If the medication is Bupropion (Wellbutrin), and the patient is taking for smoking cessation; OK to refill.         Passed - Patient is age 18 or older       Passed - Recent (6 mo) or future visit with authorizing provider's specialty    Patient had office visit in the last 6 months or has a visit in the next 30 days with authorizing provider.  See chart review.             Next 5 appointments (look out 90 days)     Dec 13, 2017  8:00 AM CST   Office Visit with Danika Hargrove MD   [facility] ([facility])    [address] [address] [phone]

## 2017-12-13 ENCOUNTER — OFFICE VISIT (OUTPATIENT)
Dept: FAMILY MEDICINE | Facility: CLINIC | Age: 47
End: 2017-12-13
Payer: COMMERCIAL

## 2017-12-13 VITALS
HEART RATE: 64 BPM | DIASTOLIC BLOOD PRESSURE: 66 MMHG | TEMPERATURE: 97.4 F | OXYGEN SATURATION: 96 % | WEIGHT: 213.2 LBS | BODY MASS INDEX: 27.36 KG/M2 | SYSTOLIC BLOOD PRESSURE: 104 MMHG | HEIGHT: 74 IN

## 2017-12-13 DIAGNOSIS — F41.1 ANXIETY STATE: ICD-10-CM

## 2017-12-13 DIAGNOSIS — Z23 NEED FOR TDAP VACCINATION: ICD-10-CM

## 2017-12-13 DIAGNOSIS — R63.4 WEIGHT LOSS: ICD-10-CM

## 2017-12-13 DIAGNOSIS — E78.5 HYPERLIPIDEMIA LDL GOAL <100: ICD-10-CM

## 2017-12-13 DIAGNOSIS — F33.1 MAJOR DEPRESSIVE DISORDER, RECURRENT, MODERATE (H): ICD-10-CM

## 2017-12-13 DIAGNOSIS — Z00.00 ENCOUNTER FOR ROUTINE ADULT HEALTH EXAMINATION WITHOUT ABNORMAL FINDINGS: Primary | ICD-10-CM

## 2017-12-13 DIAGNOSIS — I25.10 CORONARY ARTERY DISEASE INVOLVING NATIVE CORONARY ARTERY OF NATIVE HEART WITHOUT ANGINA PECTORIS: ICD-10-CM

## 2017-12-13 LAB
ANION GAP SERPL CALCULATED.3IONS-SCNC: 11 MMOL/L (ref 3–14)
BUN SERPL-MCNC: 20 MG/DL (ref 7–30)
CALCIUM SERPL-MCNC: 9.5 MG/DL (ref 8.5–10.1)
CHLORIDE SERPL-SCNC: 104 MMOL/L (ref 94–109)
CHOLEST SERPL-MCNC: 148 MG/DL
CO2 SERPL-SCNC: 24 MMOL/L (ref 20–32)
CREAT SERPL-MCNC: 1.04 MG/DL (ref 0.66–1.25)
CREAT UR-MCNC: 236 MG/DL
GFR SERPL CREATININE-BSD FRML MDRD: 76 ML/MIN/1.7M2
GLUCOSE SERPL-MCNC: 81 MG/DL (ref 70–99)
HDLC SERPL-MCNC: 55 MG/DL
LDLC SERPL CALC-MCNC: 73 MG/DL
MICROALBUMIN UR-MCNC: 8 MG/L
MICROALBUMIN/CREAT UR: 3.48 MG/G CR (ref 0–17)
NONHDLC SERPL-MCNC: 93 MG/DL
POTASSIUM SERPL-SCNC: 4 MMOL/L (ref 3.4–5.3)
SODIUM SERPL-SCNC: 139 MMOL/L (ref 133–144)
TRIGL SERPL-MCNC: 100 MG/DL

## 2017-12-13 PROCEDURE — 80061 LIPID PANEL: CPT | Performed by: FAMILY MEDICINE

## 2017-12-13 PROCEDURE — 82043 UR ALBUMIN QUANTITATIVE: CPT | Performed by: FAMILY MEDICINE

## 2017-12-13 PROCEDURE — 99213 OFFICE O/P EST LOW 20 MIN: CPT | Mod: 25 | Performed by: FAMILY MEDICINE

## 2017-12-13 PROCEDURE — 99396 PREV VISIT EST AGE 40-64: CPT | Mod: 25 | Performed by: FAMILY MEDICINE

## 2017-12-13 PROCEDURE — 90715 TDAP VACCINE 7 YRS/> IM: CPT | Performed by: FAMILY MEDICINE

## 2017-12-13 PROCEDURE — 90471 IMMUNIZATION ADMIN: CPT | Performed by: FAMILY MEDICINE

## 2017-12-13 PROCEDURE — 80048 BASIC METABOLIC PNL TOTAL CA: CPT | Performed by: FAMILY MEDICINE

## 2017-12-13 PROCEDURE — 36415 COLL VENOUS BLD VENIPUNCTURE: CPT | Performed by: FAMILY MEDICINE

## 2017-12-13 RX ORDER — ATORVASTATIN CALCIUM 40 MG/1
TABLET, FILM COATED ORAL
Qty: 90 TABLET | Refills: 1 | Status: SHIPPED | OUTPATIENT
Start: 2017-12-13 | End: 2018-01-24

## 2017-12-13 RX ORDER — BUPROPION HYDROCHLORIDE 150 MG/1
TABLET ORAL
Qty: 90 TABLET | Refills: 1 | Status: SHIPPED | OUTPATIENT
Start: 2017-12-13 | End: 2018-01-24

## 2017-12-13 RX ORDER — AMLODIPINE BESYLATE 5 MG/1
TABLET ORAL
Qty: 90 TABLET | Refills: 1 | Status: SHIPPED | OUTPATIENT
Start: 2017-12-13 | End: 2018-01-24

## 2017-12-13 ASSESSMENT — PATIENT HEALTH QUESTIONNAIRE - PHQ9
5. POOR APPETITE OR OVEREATING: NOT AT ALL
SUM OF ALL RESPONSES TO PHQ QUESTIONS 1-9: 3

## 2017-12-13 ASSESSMENT — ANXIETY QUESTIONNAIRES
IF YOU CHECKED OFF ANY PROBLEMS ON THIS QUESTIONNAIRE, HOW DIFFICULT HAVE THESE PROBLEMS MADE IT FOR YOU TO DO YOUR WORK, TAKE CARE OF THINGS AT HOME, OR GET ALONG WITH OTHER PEOPLE: NOT DIFFICULT AT ALL
1. FEELING NERVOUS, ANXIOUS, OR ON EDGE: SEVERAL DAYS
6. BECOMING EASILY ANNOYED OR IRRITABLE: SEVERAL DAYS
5. BEING SO RESTLESS THAT IT IS HARD TO SIT STILL: NOT AT ALL
3. WORRYING TOO MUCH ABOUT DIFFERENT THINGS: SEVERAL DAYS
GAD7 TOTAL SCORE: 3
2. NOT BEING ABLE TO STOP OR CONTROL WORRYING: NOT AT ALL
7. FEELING AFRAID AS IF SOMETHING AWFUL MIGHT HAPPEN: NOT AT ALL

## 2017-12-13 NOTE — PROGRESS NOTES
SUBJECTIVE:   CC: Rob Beltre is an 47 year old male who presents for preventative health visit.     Healthy Habits:    Do you get at least three servings of calcium containing foods daily (dairy, green leafy vegetables, etc.)? yes    Amount of exercise or daily activities, outside of work: 2 day(s) per week    Problems taking medications regularly No    Medication side effects: Yes Wellbutrin not working.    Have you had an eye exam in the past two years? yes    Do you see a dentist twice per year? no    Do you have sleep apnea, excessive snoring or daytime drowsiness? no      MDD- not feeling as well on the wellbutrin XL as he had on the citalopram (switched due to wt concerns).  Feels more down, more often.  Used to feel good most all of the time, now more ebbs and flows.  Not sure how much is seasonal, does think he was doing better in summer, was doing 'pretty good'.  Does like that he's been able to lose weight on the wellbutrin, though he's unsure how much was med vs lifestyle as he made signifcant changes.  Did gain on citalopram.    Weight- lost a lot of weight, back to previous baseline of ~215 from ~'05-'10.  Gain from 215 lbs to 240/145 lbs- from '11 to '16.   Now aating much better.  Lots more vegetables, lot more fruit.  Little to no bread and sugar.  Not overeating.    Had been really good in the summer- hardly any 'breaks' in the better habits.  Has been a bit harder with holidays- some bread and sugar, but portions of it are much smaller.  Just not buying bread much at all now.  How sustainable?  Hopes it can be, thinks they can keep it up.  Now 208-212 lbs.    Did feel better on this diet, until this Fall when mood worsened.    With switch from citalopram to wellbutrin, was excited that something could help his weight, may not have said anything about mood being a bit worse.    Vit D- not taking- will try starting 2000 units/day.  Exercise- 2x/wk, just walking.  Just doesn't like exercise, but  does notice a lift in his mood when he's more active.  Easier in summer, can just go outside and do things.  Another reason he hates winter.  Did get skis out- will make effort to do that more this winter.    Was in [place] in Sept, walked all around, and still had one day when he was really down.  Rest of the trip, felt pretty good.  Did have jet lag, and got sick.  Ate some fish at airport, by the time he got to [place], he had welts all over (sea bass - happened previously).     CAD/Lipids- Mild non-obstructive CAD (mild LAD stenosis on '12 angiogram).  9/14, cardiology recs-   LDL txt <100, statin as tolerated (B-blocker not tolerated), and lifelong asa.   Consider sleep study.  Rec prn f/u.  Pt continues on lipitor 40mg/d, and has made improvements in diet as above.  Is fasting, will check labs today.    Sleep apnea screening-  Cardiology had rec w/u, and he was referred, but never went.  Pt states his wife says that his snoring sx's are much less now with recent wt loss.  Not sleepy during day, not nodding off (never had those sx's).  Slight, real slight morning headaches, maybe once a week.        Today's PHQ-2 Score:   PHQ-2 ( 1999 Pf[employer]) 12/13/2017 4/14/2017   Q1: Little interest or pleasure in doing things 1 0   Q2: Feeling down, depressed or hopeless 1 0   PHQ-2 Score 2 0     Abuse: Current or Past(Physical, Sexual or Emotional)- No  Do you feel safe in your environment - Yes  Social History   Substance Use Topics     Smoking status: Former Smoker     Quit date: 7/1/2012     Smokeless tobacco: Never Used      Comment: stopped after chest pain ER visit in 7/12     Alcohol use Yes      Comment: 1-2 drinks every few wks      If you drink alcohol do you typically have >3 drinks per day or >7 drinks per week? No                      Last PSA: No results found for: PSA    Reviewed orders with patient. Reviewed health maintenance and updated orders accordingly - Yes  Patient Active Problem List   Diagnosis      Allergic rhinitis due to other allergen     Anxiety state     Major depressive disorder, recurrent, moderate (H)     Hyperlipidemia LDL goal <100     GERD (gastroesophageal reflux disease)     CAD (coronary artery disease)     Nonspecific abnormal electrocardiogram (ECG) (EKG)      Labs reviewed in EPIC  BP Readings from Last 3 Encounters:   12/13/17 104/66   09/13/17 114/68   04/14/17 101/66    Wt Readings from Last 3 Encounters:   12/13/17 213 lb 3.2 oz (96.7 kg)   09/13/17 214 lb 8 oz (97.3 kg)   04/14/17 224 lb 9.6 oz (101.9 kg)                  Current Outpatient Prescriptions   Medication Sig Dispense Refill     atorvastatin (LIPITOR) 40 MG tablet TAKE 1 TABLET (40 MG) BY MOUTH DAILY 90 tablet 1     amLODIPine (NORVASC) 5 MG tablet TAKE 1 TABLET (5 MG) BY MOUTH DAILY 90 tablet 1     buPROPion (WELLBUTRIN XL) 150 MG 24 hr tablet TAKE 1 TABLET (150 MG) BY MOUTH EVERY MORNING 90 tablet 1     cholecalciferol (VITAMIN D3) 1000 UNIT tablet Take 2 tablets (2,000 Units) by mouth daily 100 tablet 3     cyclobenzaprine (FLEXERIL) 10 MG tablet Take 0.5-1 tablets (5-10 mg) by mouth 3 times daily as needed for muscle spasms 30 tablet 1     aspirin 81 MG tablet Take 1 tablet by mouth daily. 90 tablet 3     Allergies   Allergen Reactions     Dust Mite Extract      Recent Labs   Lab Test  12/13/17   0904  04/07/17   1548  01/06/17   0807  10/28/16   1254   09/27/11   0941  09/22/11   0837   LDL  73  61  92  91   < >   --   134*   HDL  55  54  50  51   < >   --   42   TRIG  100  105  141  116   < >   --   133   ALT   --    --    --    --    --   88*  132*   CR  1.04   --    --   0.94   < >   --    --    GFRESTIMATED  76   --    --   86   < >   --    --    GFRESTBLACK  >90   --    --   >90   GFR Calc     < >   --    --    POTASSIUM  4.0   --    --   3.8   < >   --    --     < > = values in this interval not displayed.            Reviewed and updated as needed this visit by clinical staff  Tobacco  Allergies   "Meds  Problems         Reviewed and updated as needed this visit by Provider  Allergies  Meds  Problems              ROS:  10 point ROS of systems including Constitutional, Eyes, Respiratory, Cardiovascular, Gastroenterology, Genitourinary, Integumentary, Muscularskeletal, Psychiatric were all negative except for pertinent positives noted in my HPI.      OBJECTIVE:   /66  Pulse 64  Temp 97.4  F (36.3  C) (Oral)  Ht 6' 1.75\" (1.873 m)  Wt 213 lb 3.2 oz (96.7 kg)  SpO2 96%  BMI 27.56 kg/m2  EXAM:  GENERAL: healthy, alert and no distress  EYES: Eyes grossly normal to inspection, PERRL and conjunctivae and sclerae normal  HENT: ear canals and TM's normal, nose and mouth without ulcers or lesions  NECK: no adenopathy, no asymmetry, masses, or scars and thyroid normal to palpation  RESP: lungs clear to auscultation - no rales, rhonchi or wheezes  CV: regular rate and rhythm, normal S1 S2, no S3 or S4, no murmur, click or rub, no peripheral edema and peripheral pulses strong  ABDOMEN: soft, nontender, no hepatosplenomegaly, no masses and bowel sounds normal  MS: no gross musculoskeletal defects noted, no edema  SKIN: no suspicious lesions or rashes  NEURO: Normal strength and tone, mentation intact and speech normal  PSYCH: mentation appears normal, affect normal/bright    PHQ-9 SCORE 1/11/2017 4/14/2017 12/13/2017   Total Score - - -   Total Score 0 0 3     YOGESH-7 SCORE 1/11/2017 4/14/2017 12/13/2017   Total Score - - -   Total Score 2 0 3       ASSESSMENT/PLAN:       ICD-10-CM    1. Encounter for routine adult health examination without abnormal findings Z00.00    2. Major depressive disorder, recurrent, moderate (H) F33.1 buPROPion (WELLBUTRIN XL) 150 MG 24 hr tablet     cholecalciferol (VITAMIN D3) 1000 UNIT tablet   3. Weight loss R63.4    4. Hyperlipidemia LDL goal <100 E78.5 Lipid panel reflex to direct LDL Fasting     atorvastatin (LIPITOR) 40 MG tablet   5. Coronary artery disease involving native " coronary artery of native heart without angina pectoris I25.10 Lipid panel reflex to direct LDL Fasting     Basic metabolic panel  (Ca, Cl, CO2, Creat, Gluc, K, Na, BUN)     Albumin Random Urine Quantitative with Creat Ratio     amLODIPine (NORVASC) 5 MG tablet   6. Need for Tdap vaccination Z23 TDAP VACCINE (ADACEL)   7. Anxiety state F41.1 buPROPion (WELLBUTRIN XL) 150 MG 24 hr tablet     CPE- Discussed diet, calcium and exercise.  Eyes and Teeth or UTD or recommended f/u.  Tdap immunizations needed today.  See orders below for tests ordered and screening needed.      MDD/anxiety - worsening sx's recently.  Has been on wellbutrin for almost a year.  Unsure if recent worsening sx's are due to seasonal sx's (which are common for him) or if wellbutrin is not working as well as the citalopram.  He's also not sure if his previous reports of improved mood on wellbutrin were accurate- unsure if he was just happy to be able to lose weight on wellbutrin.  Had gained ~20 lbs with 20mg citalopram, and another 20 lbs with 40mg increase, and now is down ~35 lbs since switching to wellbutrin from citalopram.  Has been eating much better as well, which improved around the time of the switch. Discussed options.  At this time, he'd like to stay on the wellbutrin, same dose, and add Vitamin D at 2000 units/day, and try and be more active, and see how he does.  RTC if sx's worsening, otherwise in 6 months.    Weight loss- cont great work with diet changes, and he will try and work in more activity.    Lipids, h/o CAD on angiogram in '12.  Will check fasting labs today, cont asa and statins.  Intolerant of b-blocker.  KATHERINE screening- had rec sleep study, but pt states snoring per partner has significantly improved since losing weight.  No other significant KATHERINE sx's.  Will hold off for now.    Tdap- Risks/benefits discussed, given today.     COUNSELING:  Reviewed preventive health counseling, as reflected in patient instructions        "reports that he quit smoking about 5 years ago. He has never used smokeless tobacco.      Estimated body mass index is 27.56 kg/(m^2) as calculated from the following:    Height as of this encounter: 6' 1.75\" (1.873 m).    Weight as of this encounter: 213 lb 3.2 oz (96.7 kg).   Weight management plan: Cont great work with diet changes, cont to try and work in more activity    Counseling Resources:  ATP IV Guidelines  Pooled Cohorts Equation Calculator  FRAX Risk Assessment  ICSI Preventive Guidelines  Dietary Guidelines for Americans, 2010  USDA's MyPlate  ASA Prophylaxis  Lung CA Screening    Danika Hargrove MD  [facility]  "

## 2017-12-13 NOTE — MR AVS SNAPSHOT
After Visit Summary   12/13/2017    Rob Beltre    MRN: [id number]           Patient Information     Date Of Birth          1970        Visit Information        Provider Department      12/13/2017 8:00 AM Danika Hargrove MD [facility]        Today's Diagnoses     Encounter for routine adult health examination without abnormal findings    -  1    Major depressive disorder, recurrent, moderate (H)        Hyperlipidemia LDL goal <100        Coronary artery disease involving native coronary artery of native heart without angina pectoris        Need for Tdap vaccination        Anxiety state          Care Instructions      Preventive Health Recommendations  Male Ages 40 to 49    Yearly exam:             See your health care provider every year in order to  o   Review health changes.   o   Discuss preventive care.    o   Review your medicines if your doctor has prescribed any.    You should be tested each year for STDs (sexually transmitted diseases) if you re at risk.     Have a cholesterol test every 5 years.     Have a colonoscopy (test for colon cancer) if someone in your family has had colon cancer or polyps before age 50.     After age 45, have a diabetes test (fasting glucose). If you are at risk for diabetes, you should have this test every 3 years.      Talk with your health care provider about whether or not a prostate cancer screening test (PSA) is right for you.    Shots: Get a flu shot each year. Get a tetanus shot every 10 years.     Nutrition:    Eat at least 5 servings of fruits and vegetables daily.     Eat whole-grain bread, whole-wheat pasta and brown rice instead of white grains and rice.     Talk to your provider about Calcium and Vitamin D.     Lifestyle    Exercise for at least 150 minutes a week (30 minutes a day, 5 days a week). This will help you control your weight and prevent disease.     Limit alcohol to one drink per day.     No smoking.     Wear  "sunscreen to prevent skin cancer.     See your dentist every six months for an exam and cleaning.              Follow-ups after your visit        Who to contact     If you have questions or need follow up information about today's clinic visit or your schedule please contact [facility] directly at [phone].  Normal or non-critical lab and imaging results will be communicated to you by MyChart, letter or phone within 4 business days after the clinic has received the results. If you do not hear from us within 7 days, please contact the clinic through [employer] or phone. If you have a critical or abnormal lab result, we will notify you by phone as soon as possible.  Submit refill requests through [employer] or call your pharmacy and they will forward the refill request to us. Please allow 3 business days for your refill to be completed.          Additional Information About Your Visit        MyChart Information     [employer] lets you send messages to your doctor, view your test results, renew your prescriptions, schedule appointments and more. To sign up, go to [email or web address] . Click on \"Log in\" on the left side of the screen, which will take you to the Welcome page. Then click on \"Sign up Now\" on the right side of the page.     You will be asked to enter the access code listed below, as well as some personal information. Please follow the directions to create your username and password.     Your access code is: [email or web address]  Expires: 2017  1:42 PM     Your access code will  in 90 days. If you need help or a new code, please call your [facility] or [phone].        Care EveryWhere ID     This is your Care EveryWhere ID. This could be used by other organizations to access your [place] medical records  [id number]        Your Vitals Were     Pulse Temperature Height Pulse Oximetry BMI (Body Mass Index)       64 97.4  F (36.3  C) (Oral) 6' 1.75\" (1.873 m) 96% 27.56 kg/m2        " Blood Pressure from Last 3 Encounters:   12/13/17 104/66   09/13/17 114/68   04/14/17 101/66    Weight from Last 3 Encounters:   12/13/17 213 lb 3.2 oz (96.7 kg)   09/13/17 214 lb 8 oz (97.3 kg)   04/14/17 224 lb 9.6 oz (101.9 kg)              We Performed the Following     Albumin Random Urine Quantitative with Creat Ratio     Basic metabolic panel  (Ca, Cl, CO2, Creat, Gluc, K, Na, BUN)     Lipid panel reflex to direct LDL Fasting     TDAP VACCINE (ADACEL)          Today's Medication Changes          These changes are accurate as of: 12/13/17  8:51 AM.  If you have any questions, ask your nurse or doctor.               Start taking these medicines.        Dose/Directions    cholecalciferol 1000 UNIT tablet   Commonly known as:  vitamin D3   Used for:  Major depressive disorder, recurrent, moderate (H)   Started by:  Danika Hargrove MD        Dose:  2000 Units   Take 2 tablets (2,000 Units) by mouth daily   Quantity:  100 tablet   Refills:  3         These medicines have changed or have updated prescriptions.        Dose/Directions    amLODIPine 5 MG tablet   Commonly known as:  NORVASC   This may have changed:  See the new instructions.   Used for:  Coronary artery disease involving native coronary artery of native heart without angina pectoris   Changed by:  Danika Hargrove MD        TAKE 1 TABLET (5 MG) BY MOUTH DAILY   Quantity:  90 tablet   Refills:  1       atorvastatin 40 MG tablet   Commonly known as:  LIPITOR   This may have changed:  See the new instructions.   Used for:  Hyperlipidemia LDL goal <100   Changed by:  Danika Hargrove MD        TAKE 1 TABLET (40 MG) BY MOUTH DAILY   Quantity:  90 tablet   Refills:  1       buPROPion 150 MG 24 hr tablet   Commonly known as:  WELLBUTRIN XL   This may have changed:  See the new instructions.   Used for:  Anxiety state, Major depressive disorder, recurrent, moderate (H)   Changed by:  Danika Hargrove MD        TAKE 1 TABLET (150  MG) BY MOUTH EVERY MORNING   Quantity:  90 tablet   Refills:  1            Where to get your medicines      These medications were sent to [facility] [id number] [facility] - [place], [place] - [address]  [address] [facility] [address]     Phone:  [phone]     amLODIPine 5 MG tablet    atorvastatin 40 MG tablet    buPROPion 150 MG 24 hr tablet         Some of these will need a paper prescription and others can be bought over the counter.  Ask your nurse if you have questions.     You don't need a prescription for these medications     cholecalciferol 1000 UNIT tablet                Primary Care Provider Office Phone # Fax #    Danika Hargrove [phone] [address] [address]        Equal Access to Services     ANGELLA JAIME : Hadii rené echevarria hadasho Soomaali, waaxda luqadaha, qaybta kaalmada adeegyada, kwame emanuel . So [facility] [phone].    ATENCIÓN: Si habla español, tiene a conte disposición servicios gratuitos de asistencia lingüística. [facility] [phone].    We comply with applicable federal civil rights laws and [place] laws. We do not discriminate on the basis of race, color, national origin, age, disability, sex, sexual orientation, or gender identity.            Thank you!     Thank you for choosing [facility]  for your care. Our goal is always to provide you with excellent care. Hearing back from our patients is one way we can continue to improve our services. Please take a few minutes to complete the written survey that you may receive in the mail after your visit with us. Thank you!             Your Updated Medication List - Protect others around you: Learn how to safely use, store and throw away your medicines at www.disposemymeds.org.          This list is accurate as of: 12/13/17  8:51 AM.  Always use your most recent med list.                   Brand Name Dispense Instructions for use Diagnosis     amLODIPine 5 MG tablet    NORVASC    90 tablet    TAKE 1 TABLET (5 MG) BY MOUTH DAILY    Coronary artery disease involving native coronary artery of native heart without angina pectoris       aspirin 81 MG tablet     90 tablet    Take 1 tablet by mouth daily.    CAD (coronary artery disease)       atorvastatin 40 MG tablet    LIPITOR    90 tablet    TAKE 1 TABLET (40 MG) BY MOUTH DAILY    Hyperlipidemia LDL goal <100       buPROPion 150 MG 24 hr tablet    WELLBUTRIN XL    90 tablet    TAKE 1 TABLET (150 MG) BY MOUTH EVERY MORNING    Anxiety state, Major depressive disorder, recurrent, moderate (H)       cholecalciferol 1000 UNIT tablet    vitamin D3    100 tablet    Take 2 tablets (2,000 Units) by mouth daily    Major depressive disorder, recurrent, moderate (H)       cyclobenzaprine 10 MG tablet    FLEXERIL    30 tablet    Take 0.5-1 tablets (5-10 mg) by mouth 3 times daily as needed for muscle spasms    Acute right-sided low back pain without sciatica

## 2017-12-13 NOTE — LETTER
December 14, 2017      Rob L Alexsander  [address]   [facility] [address]        Dear ,    We are writing to inform you of your test results.    -Your basic metabolic panel (which includes electrolyte levels, blood sugar level and kidney function tests) looks normal.  -Your microalbumin level (which is the urine test that can signal signs of early chronic kidney disease if elevated to >30) is low which is good.  -Your cholesterol panel looks okay with a slightly higher LDL at 73 (the bad cholesterol) and a pretty good HDL at 55 (the good cholesterol).  For now, I'd keep your lipitor dose at 40mg/day, and continue your good work on your diet (and hopefully get a bunch of skiing in this winter).    Resulted Orders   Lipid panel reflex to direct LDL Fasting   Result Value Ref Range    Cholesterol 148 <200 mg/dL    Triglycerides 100 <150 mg/dL      Comment:      Fasting specimen    HDL Cholesterol 55 >39 mg/dL    LDL Cholesterol Calculated 73 <100 mg/dL      Comment:      Desirable:       <100 mg/dl    Non HDL Cholesterol 93 <130 mg/dL   Basic metabolic panel  (Ca, Cl, CO2, Creat, Gluc, K, Na, BUN)   Result Value Ref Range    Sodium 139 133 - 144 mmol/L    Potassium 4.0 3.4 - 5.3 mmol/L    Chloride 104 94 - 109 mmol/L    Carbon Dioxide 24 20 - 32 mmol/L    Anion Gap 11 3 - 14 mmol/L    Glucose 81 70 - 99 mg/dL      Comment:      Fasting specimen    Urea Nitrogen 20 7 - 30 mg/dL    Creatinine 1.04 0.66 - 1.25 mg/dL    GFR Estimate 76 >60 mL/min/1.7m2      Comment:      Non  GFR Calc    GFR Estimate If Black >90 >60 mL/min/1.7m2      Comment:       GFR Calc    Calcium 9.5 8.5 - 10.1 mg/dL   Albumin Random Urine Quantitative with Creat Ratio   Result Value Ref Range    Creatinine Urine 236 mg/dL    Albumin Urine mg/L 8 mg/L    Albumin Urine mg/g Cr 3.48 0 - 17 mg/g Cr       If you have any questions or concerns, please call the clinic at the number listed above.        Sincerely,        Danika Hargrove MD

## 2017-12-14 ASSESSMENT — ANXIETY QUESTIONNAIRES: GAD7 TOTAL SCORE: 3

## 2017-12-14 NOTE — PROGRESS NOTES
Please send letter below with copy of results.  Thanks! CW    Here are your lab results from your recent visit...  -Your basic metabolic panel (which includes electrolyte levels, blood sugar level and kidney function tests) looks normal.  -Your microalbumin level (which is the urine test that can signal signs of early chronic kidney disease if elevated to >30) is low which is good.  -Your cholesterol panel looks okay with a slightly higher LDL at 73 (the bad cholesterol) and a pretty good HDL at 55 (the good cholesterol).  For now, I'd keep your lipitor dose at 40mg/day, and continue your good work on your diet (and hopefully get a bunch of skiing in this winter).      Please let me know if you have any questions.  Best,   Rai Hargrove MD

## 2017-12-21 DIAGNOSIS — E78.5 HYPERLIPIDEMIA LDL GOAL <100: ICD-10-CM

## 2017-12-22 RX ORDER — ATORVASTATIN CALCIUM 40 MG/1
TABLET, FILM COATED ORAL
Start: 2017-12-22

## 2017-12-22 NOTE — TELEPHONE ENCOUNTER
Denied  Refill too early; Rx sent 12/13/2017 for 6 months  Joy CABALLERO RN    Requested Prescriptions   Pending Prescriptions Disp Refills     atorvastatin (LIPITOR) 40 MG tablet [Pharmacy Med Name: ATORVASTATIN 40 MG TABLET] 30 tablet 0     Sig: TAKE 1 TABLET BY MOUTH EVERY DAY    Statins Protocol Passed    12/21/2017  1:05 AM       Passed - LDL on file in past 12 months    Recent Labs   Lab Test  12/13/17   0904   LDL  73            Passed - No abnormal creatine kinase in past 12 months    No lab results found.         Passed - Recent or future visit with authorizing provider    Patient had office visit in the last year or has a visit in the next 30 days with authorizing provider.  See chart review.              Passed - Patient is age 18 or older

## 2018-01-02 DIAGNOSIS — F33.1 MAJOR DEPRESSIVE DISORDER, RECURRENT, MODERATE (H): ICD-10-CM

## 2018-01-02 DIAGNOSIS — F41.1 ANXIETY STATE: ICD-10-CM

## 2018-01-02 DIAGNOSIS — I25.10 CORONARY ARTERY DISEASE INVOLVING NATIVE CORONARY ARTERY OF NATIVE HEART WITHOUT ANGINA PECTORIS: ICD-10-CM

## 2018-01-03 RX ORDER — BUPROPION HYDROCHLORIDE 150 MG/1
TABLET ORAL
Refills: 0
Start: 2018-01-03

## 2018-01-03 RX ORDER — AMLODIPINE BESYLATE 5 MG/1
TABLET ORAL
Refills: 0
Start: 2018-01-03

## 2018-01-04 ENCOUNTER — NURSE TRIAGE (OUTPATIENT)
Dept: NURSING | Facility: CLINIC | Age: 48
End: 2018-01-04

## 2018-01-04 DIAGNOSIS — F33.1 MAJOR DEPRESSIVE DISORDER, RECURRENT, MODERATE (H): ICD-10-CM

## 2018-01-04 DIAGNOSIS — E78.5 HYPERLIPIDEMIA LDL GOAL <100: ICD-10-CM

## 2018-01-04 DIAGNOSIS — I25.10 CORONARY ARTERY DISEASE INVOLVING NATIVE CORONARY ARTERY OF NATIVE HEART WITHOUT ANGINA PECTORIS: ICD-10-CM

## 2018-01-04 DIAGNOSIS — F41.1 ANXIETY STATE: ICD-10-CM

## 2018-01-04 RX ORDER — BUPROPION HYDROCHLORIDE 150 MG/1
TABLET ORAL
Start: 2018-01-04

## 2018-01-04 RX ORDER — ATORVASTATIN CALCIUM 40 MG/1
TABLET, FILM COATED ORAL
Start: 2018-01-04

## 2018-01-04 RX ORDER — AMLODIPINE BESYLATE 5 MG/1
TABLET ORAL
Start: 2018-01-04

## 2018-01-04 NOTE — TELEPHONE ENCOUNTER
Clinic Action Needed:none  Reason for Call:for your information, patient called and the pharmacy did not have re refill orders from 12/13/2017 for atorvastatin, wellbutrin, and  Norvasc/ those were called into the pharmacy at [phone]   /Mitchell Mcnair RN -[phone]  Patient Recommendations/Teaching:  Routed to:Danika Navarro

## 2018-01-04 NOTE — TELEPHONE ENCOUNTER
Amlodipine, Atorvastatin, Wellbutrin:  Rx sent for all 3 - #90 with 1 refill.  Marion Collins RN

## 2018-01-04 NOTE — TELEPHONE ENCOUNTER
Refill of the medications was initiated on 12/13/ he did not want to get them then/now called the pharmacy and he says they cannot fill/

## 2018-01-24 ENCOUNTER — TELEPHONE (OUTPATIENT)
Dept: FAMILY MEDICINE | Facility: CLINIC | Age: 48
End: 2018-01-24

## 2018-01-24 DIAGNOSIS — E78.5 HYPERLIPIDEMIA LDL GOAL <100: ICD-10-CM

## 2018-01-24 DIAGNOSIS — F33.1 MAJOR DEPRESSIVE DISORDER, RECURRENT, MODERATE (H): ICD-10-CM

## 2018-01-24 DIAGNOSIS — I25.10 CORONARY ARTERY DISEASE INVOLVING NATIVE CORONARY ARTERY OF NATIVE HEART WITHOUT ANGINA PECTORIS: ICD-10-CM

## 2018-01-24 DIAGNOSIS — F41.1 ANXIETY STATE: ICD-10-CM

## 2018-01-24 RX ORDER — BUPROPION HYDROCHLORIDE 150 MG/1
TABLET ORAL
Qty: 90 TABLET | Refills: 1 | Status: SHIPPED | OUTPATIENT
Start: 2018-01-24 | End: 2018-09-05

## 2018-01-24 RX ORDER — ATORVASTATIN CALCIUM 40 MG/1
TABLET, FILM COATED ORAL
Qty: 90 TABLET | Refills: 1 | Status: SHIPPED | OUTPATIENT
Start: 2018-01-24 | End: 2018-09-05

## 2018-01-24 RX ORDER — AMLODIPINE BESYLATE 5 MG/1
TABLET ORAL
Qty: 90 TABLET | Refills: 1 | Status: SHIPPED | OUTPATIENT
Start: 2018-01-24 | End: 2018-08-09

## 2018-01-24 NOTE — TELEPHONE ENCOUNTER
Sending in rx's for six months- not sure why they didn't go through at appt in 12/17, but also unsure why the refill was automatically declined a few times when requests came in?     This provider wasn't able to open the last encounter, when pt talked to triage, and said he hadn't been able to fill the rx at pharmacy, so started a new TE encounter here.  Possible he did get the rx's as he stopped calling, or gave up, but sent new ones in regardless.    Will send on to triage as FYI, and to check w/ pt/pharmacy if rx's requests come early.    Thanks-CW

## 2018-08-09 DIAGNOSIS — I25.10 CORONARY ARTERY DISEASE INVOLVING NATIVE CORONARY ARTERY OF NATIVE HEART WITHOUT ANGINA PECTORIS: ICD-10-CM

## 2018-08-09 RX ORDER — AMLODIPINE BESYLATE 5 MG/1
TABLET ORAL
Qty: 30 TABLET | Refills: 0 | Status: SHIPPED | OUTPATIENT
Start: 2018-08-09 | End: 2018-09-05

## 2018-08-09 NOTE — TELEPHONE ENCOUNTER
"Medication is being filled for 1 time refill only due to:  Patient needs to be seen because due for 6 month f/u.   Note from 12/13/17 OV:  RTC if sx's worsening, otherwise in 6 months.  Marion Collins RN    Requested Prescriptions   Pending Prescriptions Disp Refills     amLODIPine (NORVASC) 5 MG tablet [Pharmacy Med Name: AMLODIPINE BESYLATE 5 MG TAB] 30 tablet 0     Sig: TAKE 1 TABLET BY MOUTH DAILY.    Calcium Channel Blockers Protocol  Passed    8/9/2018  1:23 AM       Passed - Blood pressure under 140/90 in past 12 months    BP Readings from Last 3 Encounters:   12/13/17 104/66   09/13/17 114/68   04/14/17 101/66                Passed - Recent (12 mo) or future (30 days) visit within the authorizing provider's specialty    Patient had office visit in the last 12 months or has a visit in the next 30 days with authorizing provider or within the authorizing provider's specialty.  See \"Patient Info\" tab in inbasket, or \"Choose Columns\" in Meds & Orders section of the refill encounter.           Passed - Patient is age 18 or older       Passed - Normal serum creatinine on file in past 12 months    Recent Labs   Lab Test  12/13/17   0904   CR  1.04                 "

## 2018-09-05 ENCOUNTER — OFFICE VISIT (OUTPATIENT)
Dept: FAMILY MEDICINE | Facility: CLINIC | Age: 48
End: 2018-09-05
Payer: COMMERCIAL

## 2018-09-05 VITALS
SYSTOLIC BLOOD PRESSURE: 113 MMHG | HEIGHT: 74 IN | TEMPERATURE: 98.5 F | WEIGHT: 208.9 LBS | DIASTOLIC BLOOD PRESSURE: 76 MMHG | BODY MASS INDEX: 26.81 KG/M2 | HEART RATE: 65 BPM | OXYGEN SATURATION: 96 %

## 2018-09-05 DIAGNOSIS — E78.5 HYPERLIPIDEMIA LDL GOAL <100: ICD-10-CM

## 2018-09-05 DIAGNOSIS — F33.1 MAJOR DEPRESSIVE DISORDER, RECURRENT, MODERATE (H): ICD-10-CM

## 2018-09-05 DIAGNOSIS — I25.10 CORONARY ARTERY DISEASE INVOLVING NATIVE CORONARY ARTERY OF NATIVE HEART WITHOUT ANGINA PECTORIS: ICD-10-CM

## 2018-09-05 DIAGNOSIS — F41.1 ANXIETY STATE: ICD-10-CM

## 2018-09-05 PROCEDURE — 99214 OFFICE O/P EST MOD 30 MIN: CPT | Performed by: FAMILY MEDICINE

## 2018-09-05 RX ORDER — ATORVASTATIN CALCIUM 40 MG/1
TABLET, FILM COATED ORAL
Qty: 90 TABLET | Refills: 1 | Status: SHIPPED | OUTPATIENT
Start: 2018-09-05 | End: 2019-04-24

## 2018-09-05 RX ORDER — BUPROPION HYDROCHLORIDE 150 MG/1
TABLET ORAL
Qty: 90 TABLET | Refills: 1 | Status: SHIPPED | OUTPATIENT
Start: 2018-09-05 | End: 2019-04-24

## 2018-09-05 RX ORDER — AMLODIPINE BESYLATE 5 MG/1
TABLET ORAL
Qty: 90 TABLET | Refills: 1 | Status: SHIPPED | OUTPATIENT
Start: 2018-09-05 | End: 2019-03-15

## 2018-09-05 ASSESSMENT — ANXIETY QUESTIONNAIRES
3. WORRYING TOO MUCH ABOUT DIFFERENT THINGS: NOT AT ALL
2. NOT BEING ABLE TO STOP OR CONTROL WORRYING: NOT AT ALL
IF YOU CHECKED OFF ANY PROBLEMS ON THIS QUESTIONNAIRE, HOW DIFFICULT HAVE THESE PROBLEMS MADE IT FOR YOU TO DO YOUR WORK, TAKE CARE OF THINGS AT HOME, OR GET ALONG WITH OTHER PEOPLE: NOT DIFFICULT AT ALL
6. BECOMING EASILY ANNOYED OR IRRITABLE: NOT AT ALL
7. FEELING AFRAID AS IF SOMETHING AWFUL MIGHT HAPPEN: NOT AT ALL
GAD7 TOTAL SCORE: 1
1. FEELING NERVOUS, ANXIOUS, OR ON EDGE: SEVERAL DAYS
5. BEING SO RESTLESS THAT IT IS HARD TO SIT STILL: NOT AT ALL

## 2018-09-05 ASSESSMENT — PATIENT HEALTH QUESTIONNAIRE - PHQ9: 5. POOR APPETITE OR OVEREATING: NOT AT ALL

## 2018-09-05 NOTE — MR AVS SNAPSHOT
"              After Visit Summary   2018    Rob Beltre    MRN: [id number]           Patient Information     Date Of Birth          1970        Visit Information        Provider Department      2018 11:30 AM Danika Hargrove MD [facility]        Today's Diagnoses     Major depressive disorder, recurrent, moderate (H)        Anxiety state        Coronary artery disease involving native coronary artery of native heart without angina pectoris        Hyperlipidemia LDL goal <100           Follow-ups after your visit        Who to contact     If you have questions or need follow up information about today's clinic visit or your schedule please contact [facility] directly at [phone].  Normal or non-critical lab and imaging results will be communicated to you by MyChart, letter or phone within 4 business days after the clinic has received the results. If you do not hear from us within 7 days, please contact the clinic through MyChart or phone. If you have a critical or abnormal lab result, we will notify you by phone as soon as possible.  Submit refill requests through [employer] or call your pharmacy and they will forward the refill request to us. Please allow 3 business days for your refill to be completed.          Additional Information About Your Visit        MyChart Information     [employer] lets you send messages to your doctor, view your test results, renew your prescriptions, schedule appointments and more. To sign up, go to [email or web address] . Click on \"Log in\" on the left side of the screen, which will take you to the Welcome page. Then click on \"Sign up Now\" on the right side of the page.     You will be asked to enter the access code listed below, as well as some personal information. Please follow the directions to create your username and password.     Your access code is: [email or web address]  Expires: 2018 12:59 PM     Your access code will  in 90 " "days. If you need help or a new code, please call your [facility] clinic or [phone].        Care EveryWhere ID     This is your Care EveryWhere ID. This could be used by other organizations to access your [facility] medical records  [id number]        Your Vitals Were     Pulse Temperature Height Pulse Oximetry BMI (Body Mass Index)       65 98.5  F (36.9  C) (Oral) 6' 1.75\" (1.873 m) 96% 27 kg/m2        Blood Pressure from Last 3 Encounters:   09/05/18 113/76   12/13/17 104/66   09/13/17 114/68    Weight from Last 3 Encounters:   09/05/18 208 lb 14.4 oz (94.8 kg)   12/13/17 213 lb 3.2 oz (96.7 kg)   09/13/17 214 lb 8 oz (97.3 kg)              Today, you had the following     No orders found for display         Where to get your medicines      These medications were sent to [facility]/pharmacy #[id number] [place] [address] [address] [address]     Phone:  [phone]     amLODIPine 5 MG tablet    atorvastatin 40 MG tablet    buPROPion 150 MG 24 hr tablet          Primary Care Provider Office Phone # Fax #    Danika Hargrove [phone]       [facility] [address] [address]        Equal Access to Services     [facility]: Hadii aad ku hadasho Sotemitopeali, waaxda luqadaha, qaybta kaalmada adestewyada, kwame emanuel . So [facility] [phone].    ATENCIÓN: Si habla español, tiene a conte disposición servicios gratuitos de asistencia lingüística. Llame al [phone].    We comply with applicable federal civil rights laws and [place] laws. We do not discriminate on the basis of race, color, national origin, age, disability, sex, sexual orientation, or gender identity.            Thank you!     Thank you for choosing [facility]  for your care. Our goal is always to provide you with excellent care. Hearing back from our patients is one way we can continue to improve our services. Please take a few minutes " to complete the written survey that you may receive in the mail after your visit with us. Thank you!             Your Updated Medication List - Protect others around you: Learn how to safely use, store and throw away your medicines at www.disposemymeds.org.          This list is accurate as of 9/5/18 12:59 PM.  Always use your most recent med list.                   Brand Name Dispense Instructions for use Diagnosis    amLODIPine 5 MG tablet    NORVASC    90 tablet    TAKE 1 TABLET BY MOUTH DAILY.    Coronary artery disease involving native coronary artery of native heart without angina pectoris       aspirin 81 MG tablet     90 tablet    Take 1 tablet by mouth daily.    CAD (coronary artery disease)       atorvastatin 40 MG tablet    LIPITOR    90 tablet    TAKE 1 TABLET (40 MG) BY MOUTH DAILY    Hyperlipidemia LDL goal <100       buPROPion 150 MG 24 hr tablet    WELLBUTRIN XL    90 tablet    TAKE 1 TABLET (150 MG) BY MOUTH EVERY MORNING    Anxiety state, Major depressive disorder, recurrent, moderate (H)       cholecalciferol 1000 UNIT tablet    vitamin D3    100 tablet    Take 2 tablets (2,000 Units) by mouth daily    Major depressive disorder, recurrent, moderate (H)       cyclobenzaprine 10 MG tablet    FLEXERIL    30 tablet    Take 0.5-1 tablets (5-10 mg) by mouth 3 times daily as needed for muscle spasms    Acute right-sided low back pain without sciatica

## 2018-09-05 NOTE — PROGRESS NOTES
SUBJECTIVE:   Rob Beltre is a 47 year old male who presents to clinic today for the following health issues:    Depression Followup    Status since last visit: Improved     See PHQ-9 for current symptoms.  Other associated symptoms: None    Complicating factors:   Significant life event:  No   Current substance abuse:  None  Anxiety or Panic symptoms:  No    PHQ-9 4/14/2017 12/13/2017 9/5/2018   Total Score 0 3 1   Q9: Suicide Ideation Not at all Not at all Not at all       Amount of exercise or physical activity: 2-3    Problems taking medications regularly: some missed doses on weekends    Medication side effects: none    Diet: regular (no restrictions)    Mood-mood has been good/stable on the wellbutrin XL.   Generally feels good, maybe a day or two a month feels a little down/depressed.  Then it passes and he feels good.  Stressed with work.  Weight is back to pre-citalopram weight.  Wt loss with switch of meds and lower carb diet (much less breads, rice, sugar).  205-2-8 lbs, goal 200 lbs.    HTN- doing well on the norvasc 5mg/d, no se's.    Concerns - last few days, soreness in left lower chest.  Kind of there, no other sx's.  Better when he works out.  No associated cardiac sx's- no chest pressure/squeezing, no SOB, no sweating, nausea.  Sx's worst after getting up from sitting, better if he's walking or exercising.    Problem list and histories reviewed & adjusted, as indicated.  Additional history: as documented    Patient Active Problem List   Diagnosis     Allergic rhinitis due to other allergen     Anxiety state     Major depressive disorder, recurrent, moderate (H)     Hyperlipidemia LDL goal <100     GERD (gastroesophageal reflux disease)     CAD (coronary artery disease)     Nonspecific abnormal electrocardiogram (ECG) (EKG)      Current Outpatient Prescriptions   Medication Sig Dispense Refill     amLODIPine (NORVASC) 5 MG tablet TAKE 1 TABLET BY MOUTH DAILY. 90 tablet 1     atorvastatin  "(LIPITOR) 40 MG tablet TAKE 1 TABLET (40 MG) BY MOUTH DAILY 90 tablet 1     buPROPion (WELLBUTRIN XL) 150 MG 24 hr tablet TAKE 1 TABLET (150 MG) BY MOUTH EVERY MORNING 90 tablet 1     cholecalciferol (VITAMIN D3) 1000 UNIT tablet Take 2 tablets (2,000 Units) by mouth daily 100 tablet 3     aspirin 81 MG tablet Take 1 tablet by mouth daily. (Patient not taking: Reported on 9/5/2018) 90 tablet 3     cyclobenzaprine (FLEXERIL) 10 MG tablet Take 0.5-1 tablets (5-10 mg) by mouth 3 times daily as needed for muscle spasms (Patient not taking: Reported on 9/5/2018) 30 tablet 1     Allergies   Allergen Reactions     Dust Mite Extract      Recent Labs   Lab Test  12/13/17   0904  04/07/17   1548  01/06/17   0807  10/28/16   1254   09/27/11   0941  09/22/11   0837   LDL  73  61  92  91   < >   --   134*   HDL  55  54  50  51   < >   --   42   TRIG  100  105  141  116   < >   --   133   ALT   --    --    --    --    --   88*  132*   CR  1.04   --    --   0.94   < >   --    --    GFRESTIMATED  76   --    --   86   < >   --    --    GFRESTBLACK  >90   --    --   >90   GFR Calc     < >   --    --    POTASSIUM  4.0   --    --   3.8   < >   --    --     < > = values in this interval not displayed.      BP Readings from Last 3 Encounters:   09/05/18 113/76   12/13/17 104/66   09/13/17 114/68    Wt Readings from Last 3 Encounters:   09/05/18 208 lb 14.4 oz (94.8 kg)   12/13/17 213 lb 3.2 oz (96.7 kg)   09/13/17 214 lb 8 oz (97.3 kg)           Labs reviewed in EPIC    Reviewed and updated as needed this visit by clinical staff  Tobacco  Allergies  Meds  Problems  Med Hx  Surg Hx  Fam Hx  Soc Hx        Reviewed and updated as needed this visit by Provider  Allergies  Meds  Problems         ROS:  Constitutional, HEENT, cardiovascular, pulmonary, gi and gu systems are negative, except as otherwise noted.    OBJECTIVE:     /76  Pulse 65  Temp 98.5  F (36.9  C) (Oral)  Ht 6' 1.75\" (1.873 m)  Wt 208 lb " 14.4 oz (94.8 kg)  SpO2 96%  BMI 27 kg/m2  Body mass index is 27 kg/(m^2).  GENERAL APPEARANCE: healthy, alert and no distress     EYES: PERRL, sclera clear     HENT: nose and mouth without ulcers or lesions     NECK: no adenopathy, no asymmetry, masses, or scars and thyroid normal to palpation     RESP: lungs clear to auscultation - no rales, rhonchi or wheezes     CV: regular rates and rhythm, normal S1 S2, no S3 or S4 and no murmur, click or rub      Abdomen: soft, nontender, no HSM or masses and bowel sounds normal     Ext: warm, dry, no edema      Psych: full range affect, normal speech and grooming, judgement and insight intact     Diagnostic Test Results:  Results for orders placed or performed in visit on 12/13/17   Lipid panel reflex to direct LDL Fasting   Result Value Ref Range    Cholesterol 148 <200 mg/dL    Triglycerides 100 <150 mg/dL    HDL Cholesterol 55 >39 mg/dL    LDL Cholesterol Calculated 73 <100 mg/dL    Non HDL Cholesterol 93 <130 mg/dL   Basic metabolic panel  (Ca, Cl, CO2, Creat, Gluc, K, Na, BUN)   Result Value Ref Range    Sodium 139 133 - 144 mmol/L    Potassium 4.0 3.4 - 5.3 mmol/L    Chloride 104 94 - 109 mmol/L    Carbon Dioxide 24 20 - 32 mmol/L    Anion Gap 11 3 - 14 mmol/L    Glucose 81 70 - 99 mg/dL    Urea Nitrogen 20 7 - 30 mg/dL    Creatinine 1.04 0.66 - 1.25 mg/dL    GFR Estimate 76 >60 mL/min/1.7m2    GFR Estimate If Black >90 >60 mL/min/1.7m2    Calcium 9.5 8.5 - 10.1 mg/dL   Albumin Random Urine Quantitative with Creat Ratio   Result Value Ref Range    Creatinine Urine 236 mg/dL    Albumin Urine mg/L 8 mg/L    Albumin Urine mg/g Cr 3.48 0 - 17 mg/g Cr       ASSESSMENT/PLAN:       ICD-10-CM    1. Major depressive disorder, recurrent, moderate (H) F33.1 buPROPion (WELLBUTRIN XL) 150 MG 24 hr tablet   2. Anxiety state F41.1 buPROPion (WELLBUTRIN XL) 150 MG 24 hr tablet   3. Coronary artery disease involving native coronary artery of native heart without angina pectoris  I25.10 amLODIPine (NORVASC) 5 MG tablet   4. Hyperlipidemia LDL goal <100 E78.5 atorvastatin (LIPITOR) 40 MG tablet     MDD/Anxiety- doing well on the wellbutrin XL 150mg/d.  Will continue.  Weight has also gone down to about his previous baseline since stopping the citalopram, and working on lower-carb diet and exercising more.    CAD- h/o mild LAD stenosis on '12 angiogram.  Seen by cardiology for a couple yrs.  B-blockers stopped due to se's, so switched to norvasc.  Pt frustrated with dx due to inability to get life insurance, but will want to cont on asa, norvasc, and lipitor for secondary prevention, especially with fam h/o early CAD.  Cont great work with diet/exercise as above.    Cont q6mo f/u, will do fasting lipids/labs at next visit.    Danika Hargrove MD  [facility]

## 2018-09-05 NOTE — NURSING NOTE
"Chief Complaint   Patient presents with     Depression     f/u on medications, all meds     /76  Pulse 65  Temp 98.5  F (36.9  C) (Oral)  Ht 6' 1.75\" (1.873 m)  Wt 208 lb 14.4 oz (94.8 kg)  SpO2 96%  BMI 27 kg/m2 Estimated body mass index is 27 kg/(m^2) as calculated from the following:    Height as of this encounter: 6' 1.75\" (1.873 m).    Weight as of this encounter: 208 lb 14.4 oz (94.8 kg).        Health Maintenance due pending provider review:  PHQ9    PHQ/ACT/YOGESH--Gave pt questionnare    Kym Guerrero CMA  "

## 2018-09-06 DIAGNOSIS — I25.10 CORONARY ARTERY DISEASE INVOLVING NATIVE CORONARY ARTERY OF NATIVE HEART WITHOUT ANGINA PECTORIS: ICD-10-CM

## 2018-09-06 RX ORDER — AMLODIPINE BESYLATE 5 MG/1
TABLET ORAL
Start: 2018-09-06

## 2018-09-06 ASSESSMENT — ANXIETY QUESTIONNAIRES: GAD7 TOTAL SCORE: 1

## 2018-09-06 ASSESSMENT — PATIENT HEALTH QUESTIONNAIRE - PHQ9: SUM OF ALL RESPONSES TO PHQ QUESTIONS 1-9: 1

## 2018-09-06 NOTE — TELEPHONE ENCOUNTER
"Rx sent yesterday.  Marion Collins, LINDSAY    Requested Prescriptions   Refused Prescriptions Disp Refills     amLODIPine (NORVASC) 5 MG tablet [Pharmacy Med Name: AMLODIPINE BESYLATE 5 MG TAB]       Sig: TAKE 1 TABLET BY MOUTH EVERY DAY    Calcium Channel Blockers Protocol  Passed    9/6/2018  1:40 AM       Passed - Blood pressure under 140/90 in past 12 months    BP Readings from Last 3 Encounters:   09/05/18 113/76   12/13/17 104/66   09/13/17 114/68                Passed - Recent (12 mo) or future (30 days) visit within the authorizing provider's specialty    Patient had office visit in the last 12 months or has a visit in the next 30 days with authorizing provider or within the authorizing provider's specialty.  See \"Patient Info\" tab in inbasket, or \"Choose Columns\" in Meds & Orders section of the refill encounter.           Passed - Patient is age 18 or older       Passed - Normal serum creatinine on file in past 12 months    Recent Labs   Lab Test  12/13/17   0904   CR  1.04               "

## 2019-03-15 DIAGNOSIS — I25.10 CORONARY ARTERY DISEASE INVOLVING NATIVE CORONARY ARTERY OF NATIVE HEART WITHOUT ANGINA PECTORIS: ICD-10-CM

## 2019-03-15 RX ORDER — AMLODIPINE BESYLATE 5 MG/1
TABLET ORAL
Qty: 30 TABLET | Refills: 0 | Status: SHIPPED | OUTPATIENT
Start: 2019-03-15 | End: 2019-04-16

## 2019-03-15 NOTE — TELEPHONE ENCOUNTER
"Medication is being filled for 1 time refill only due to:  Patient needs to be seen because due for follow up office visit.   Note fr om 9/5/18 OV:  Cont q6mo f/u, will do fasting lipids/labs at next visit.     Marion Collins RN    Requested Prescriptions   Pending Prescriptions Disp Refills     amLODIPine (NORVASC) 5 MG tablet [Pharmacy Med Name: AMLODIPINE BESYLATE 5 MG TAB] 30 tablet 0     Sig: TAKE 1 TABLET BY MOUTH EVERY DAY    Calcium Channel Blockers Protocol  Failed - 3/15/2019  1:46 AM       Failed - Normal serum creatinine on file in past 12 months    Recent Labs   Lab Test 12/13/17  0904   CR 1.04            Passed - Blood pressure under 140/90 in past 12 months    BP Readings from Last 3 Encounters:   09/05/18 113/76   12/13/17 104/66   09/13/17 114/68                Passed - Recent (12 mo) or future (30 days) visit within the authorizing provider's specialty    Patient had office visit in the last 12 months or has a visit in the next 30 days with authorizing provider or within the authorizing provider's specialty.  See \"Patient Info\" tab in inbasket, or \"Choose Columns\" in Meds & Orders section of the refill encounter.             Passed - Medication is active on med list       Passed - Patient is age 18 or older              "

## 2019-04-16 DIAGNOSIS — I25.10 CORONARY ARTERY DISEASE INVOLVING NATIVE CORONARY ARTERY OF NATIVE HEART WITHOUT ANGINA PECTORIS: ICD-10-CM

## 2019-04-16 NOTE — TELEPHONE ENCOUNTER
"Refill sent for one month supply 3/15/19  Due for BP follow up and fasting labs.    VM left asking patient to call clinic.  Marion Collins, LINDSAY    Requested Prescriptions   Pending Prescriptions Disp Refills     amLODIPine (NORVASC) 5 MG tablet [Pharmacy Med Name: AMLODIPINE BESYLATE 5 MG TAB] 30 tablet 0     Sig: TAKE 1 TABLET BY MOUTH EVERY DAY       Calcium Channel Blockers Protocol  Failed - 4/16/2019  1:43 AM        Failed - Normal serum creatinine on file in past 12 months     Recent Labs   Lab Test 12/13/17  0904   CR 1.04             Passed - Blood pressure under 140/90 in past 12 months     BP Readings from Last 3 Encounters:   09/05/18 113/76   12/13/17 104/66   09/13/17 114/68                 Passed - Recent (12 mo) or future (30 days) visit within the authorizing provider's specialty     Patient had office visit in the last 12 months or has a visit in the next 30 days with authorizing provider or within the authorizing provider's specialty.  See \"Patient Info\" tab in inbasket, or \"Choose Columns\" in Meds & Orders section of the refill encounter.              Passed - Medication is active on med list        Passed - Patient is age 18 or older          "

## 2019-04-22 RX ORDER — AMLODIPINE BESYLATE 5 MG/1
TABLET ORAL
Qty: 30 TABLET | Refills: 0 | Status: SHIPPED | OUTPATIENT
Start: 2019-04-22 | End: 2019-04-24

## 2019-04-22 NOTE — TELEPHONE ENCOUNTER
Medication is being filled for 1 time refill only due to:  Patient needs to be seen because due for physical/BP follow up.   Marion Collins RN

## 2019-04-22 NOTE — TELEPHONE ENCOUNTER
CW,   Left  #2 for patient  See below messages  No response from patient to our outreach  Please advise on further refill  Thanks,  Joy CABALLERO RN

## 2019-04-24 ENCOUNTER — OFFICE VISIT (OUTPATIENT)
Dept: FAMILY MEDICINE | Facility: CLINIC | Age: 49
End: 2019-04-24
Payer: COMMERCIAL

## 2019-04-24 VITALS
OXYGEN SATURATION: 98 % | WEIGHT: 213 LBS | DIASTOLIC BLOOD PRESSURE: 67 MMHG | RESPIRATION RATE: 16 BRPM | BODY MASS INDEX: 27.34 KG/M2 | HEIGHT: 74 IN | TEMPERATURE: 98 F | SYSTOLIC BLOOD PRESSURE: 104 MMHG | HEART RATE: 60 BPM

## 2019-04-24 DIAGNOSIS — F33.1 MAJOR DEPRESSIVE DISORDER, RECURRENT, MODERATE (H): ICD-10-CM

## 2019-04-24 DIAGNOSIS — E78.5 HYPERLIPIDEMIA LDL GOAL <100: ICD-10-CM

## 2019-04-24 DIAGNOSIS — I25.10 CORONARY ARTERY DISEASE INVOLVING NATIVE CORONARY ARTERY OF NATIVE HEART WITHOUT ANGINA PECTORIS: ICD-10-CM

## 2019-04-24 DIAGNOSIS — M54.50 ACUTE BILATERAL LOW BACK PAIN WITHOUT SCIATICA: ICD-10-CM

## 2019-04-24 DIAGNOSIS — F41.1 ANXIETY STATE: ICD-10-CM

## 2019-04-24 DIAGNOSIS — Z12.11 COLON CANCER SCREENING: ICD-10-CM

## 2019-04-24 DIAGNOSIS — Z00.00 ENCOUNTER FOR ROUTINE ADULT HEALTH EXAMINATION WITHOUT ABNORMAL FINDINGS: Primary | ICD-10-CM

## 2019-04-24 LAB
ANION GAP SERPL CALCULATED.3IONS-SCNC: 8 MMOL/L (ref 3–14)
BUN SERPL-MCNC: 20 MG/DL (ref 7–30)
CALCIUM SERPL-MCNC: 9.3 MG/DL (ref 8.5–10.1)
CHLORIDE SERPL-SCNC: 107 MMOL/L (ref 94–109)
CHOLEST SERPL-MCNC: 154 MG/DL
CO2 SERPL-SCNC: 24 MMOL/L (ref 20–32)
CREAT SERPL-MCNC: 1.1 MG/DL (ref 0.66–1.25)
CREAT UR-MCNC: 186 MG/DL
GFR SERPL CREATININE-BSD FRML MDRD: 78 ML/MIN/{1.73_M2}
GLUCOSE SERPL-MCNC: 95 MG/DL (ref 70–99)
HDLC SERPL-MCNC: 60 MG/DL
LDLC SERPL CALC-MCNC: 73 MG/DL
MICROALBUMIN UR-MCNC: 8 MG/L
MICROALBUMIN/CREAT UR: 4.44 MG/G CR (ref 0–17)
NONHDLC SERPL-MCNC: 94 MG/DL
POTASSIUM SERPL-SCNC: 4.5 MMOL/L (ref 3.4–5.3)
SODIUM SERPL-SCNC: 139 MMOL/L (ref 133–144)
TRIGL SERPL-MCNC: 104 MG/DL

## 2019-04-24 PROCEDURE — 82043 UR ALBUMIN QUANTITATIVE: CPT | Performed by: FAMILY MEDICINE

## 2019-04-24 PROCEDURE — 80061 LIPID PANEL: CPT | Performed by: FAMILY MEDICINE

## 2019-04-24 PROCEDURE — 36415 COLL VENOUS BLD VENIPUNCTURE: CPT | Performed by: FAMILY MEDICINE

## 2019-04-24 PROCEDURE — 80048 BASIC METABOLIC PNL TOTAL CA: CPT | Performed by: FAMILY MEDICINE

## 2019-04-24 PROCEDURE — 99396 PREV VISIT EST AGE 40-64: CPT | Performed by: FAMILY MEDICINE

## 2019-04-24 PROCEDURE — 99214 OFFICE O/P EST MOD 30 MIN: CPT | Mod: 25 | Performed by: FAMILY MEDICINE

## 2019-04-24 RX ORDER — ATORVASTATIN CALCIUM 40 MG/1
TABLET, FILM COATED ORAL
Qty: 90 TABLET | Refills: 1 | Status: SHIPPED | OUTPATIENT
Start: 2019-04-24 | End: 2019-11-19

## 2019-04-24 RX ORDER — AMLODIPINE BESYLATE 2.5 MG/1
2.5 TABLET ORAL DAILY
Qty: 90 TABLET | Refills: 1 | Status: SHIPPED | OUTPATIENT
Start: 2019-04-24 | End: 2019-10-24

## 2019-04-24 RX ORDER — BUPROPION HYDROCHLORIDE 150 MG/1
TABLET ORAL
Qty: 90 TABLET | Refills: 1 | Status: SHIPPED | OUTPATIENT
Start: 2019-04-24 | End: 2019-11-15

## 2019-04-24 ASSESSMENT — MIFFLIN-ST. JEOR: SCORE: 1887.97

## 2019-04-24 NOTE — PROGRESS NOTES
SUBJECTIVE:   CC: Rob Beltre is an 48 year old male who presents for preventative health visit.     Healthy Habits:     Getting at least 3 servings of Calcium per day:  Yes    Bi-annual eye exam:  Yes    Dental care twice a year:  NO    Sleep apnea or symptoms of sleep apnea:  None    Diet:  Low salt and Low fat/cholesterol    Frequency of exercise:  2-3 days/week    Duration of exercise:  15-30 minutes    Taking medications regularly:  Yes    Medication side effects:  None    PHQ-2 Total Score: 1    Additional concerns today:  Yes    7/12- CAD event, mild LAD stenosis only, possible coronary artery spasm.  Sx's of chest pain, left arm tingling at the time.  No return of sx's since.  Continues on lipitor 40mg/d, asa at 81mg/d and norvasc 5mg/d- as directed by cardiology.  Has not seen cardiology back for several yrs.  Se's?  Soreness after exercise, but more likely due to not exercising enough.  Exercise- mostly walking.  Sat- did a lot of yard work and long hike- got sore, but understandable.    Norvasc use- never really with HTN, on med with CAD history (switched from b-blocker due to se's).  BP looks great today.  Doesn't check on his own.  No se's, but BP's have been quite low.      MDD/Anxiety- on   Sx's have been pretty good, but has bad days for 1- 1 1/2 days, more after lots of cloudy days.  Taking Vit D ~2000 units.  Also on the wellbutrin XL 150mg/d.    Wt is down-   More vegetables/fruits, and smaller quantities, for past 1-2 yrs.  Also switched off citalopram to wellbutrin in '16.  Did them at the same time.  Wt was ~235- jumped to 245 in 1/17.  Summer weight usually ~208 lbs- would like to get it to 200 lbs.  Winter weight often up ~5 lbs.      Lower and upper back issues started this past Saturday same thing happened this past winter while shoveling snow.    Back sx's-   This past winter, he had a bad flare, out for a few days, in bed/couch.  Took him 4-5 days to get back into things.  Happened  after shoveling heavy snow.  Pain in low back and upper back.  No radiation.  Only really bad flare.  This past weekend- exercise, walking, yard work.  Back was a little stiff.        Today's PHQ-2 Score:   PHQ-2 (  [employer]) 2019   Q1: Little interest or pleasure in doing things 0   Q2: Feeling down, depressed or hopeless 1   PHQ-2 Score 1   Q1: Little interest or pleasure in doing things Not at all   Q2: Feeling down, depressed or hopeless Several days   PHQ-2 Score 1       Abuse: Current or Past(Physical, Sexual or Emotional)- No  Do you feel safe in your environment? Yes    Social History     Tobacco Use     Smoking status: Former Smoker     Last attempt to quit: 2012     Years since quittin.8     Smokeless tobacco: Never Used     Tobacco comment: stopped after chest pain ER visit in    Substance Use Topics     Alcohol use: Yes     Comment: 1-2 drinks every few wks     If you drink alcohol do you typically have >3 drinks per day or >7 drinks per week? No    Alcohol Use 2019   Prescreen: >3 drinks/day or >7 drinks/week? No   Prescreen: >3 drinks/day or >7 drinks/week? -   No flowsheet data found.    Last PSA: No results found for: PSA    Reviewed orders with patient. Reviewed health maintenance and updated orders accordingly - Yes    Labs reviewed in EPIC  BP Readings from Last 3 Encounters:   19 104/67   18 113/76   17 104/66    Wt Readings from Last 3 Encounters:   19 96.6 kg (213 lb)   18 94.8 kg (208 lb 14.4 oz)   17 96.7 kg (213 lb 3.2 oz)                  Patient Active Problem List   Diagnosis     Allergic rhinitis due to other allergen     Anxiety state     Major depressive disorder, recurrent, moderate (H)     Hyperlipidemia LDL goal <100     GERD (gastroesophageal reflux disease)     CAD (coronary artery disease)     Nonspecific abnormal electrocardiogram (ECG) (EKG)     Past Surgical History:   Procedure Laterality Date     CARDIAC ECHO  ([facility])      [facility], borderline concentric LVH, EF 55%     NO HISTORY OF SURGERY         Social History     Tobacco Use     Smoking status: Former Smoker     Last attempt to quit: 2012     Years since quittin.8     Smokeless tobacco: Never Used     Tobacco comment: stopped after chest pain ER visit in    Substance Use Topics     Alcohol use: Yes     Comment: 1-2 drinks every few wks     Family History   Problem Relation Age of Onset     Depression Mother      Gynecology Mother         Hysterectomy     Heart Disease Father         MI around age 60     Allergies Father      Lipids Father      Prostate Cancer Father      Genitourinary Problems Maternal Grandfather         Unsure of what     Respiratory Maternal Grandfather         Breathing problems from farming     Heart Disease Paternal Grandfather         MI around 65         Current Outpatient Medications   Medication Sig Dispense Refill     amLODIPine (NORVASC) 2.5 MG tablet Take 1 tablet (2.5 mg) by mouth daily 90 tablet 1     aspirin 81 MG tablet Take 1 tablet by mouth daily. 90 tablet 3     atorvastatin (LIPITOR) 40 MG tablet TAKE 1 TABLET (40 MG) BY MOUTH DAILY 90 tablet 1     buPROPion (WELLBUTRIN XL) 150 MG 24 hr tablet TAKE 1 TABLET (150 MG) BY MOUTH EVERY MORNING 90 tablet 1     cholecalciferol (VITAMIN D3) 1000 UNIT tablet Take 2 tablets (2,000 Units) by mouth daily 100 tablet 3     cyclobenzaprine (FLEXERIL) 10 MG tablet Take 0.5-1 tablets (5-10 mg) by mouth 3 times daily as needed for muscle spasms 30 tablet 1     Allergies   Allergen Reactions     Dust Mite Extract      Recent Labs   Lab Test 19  0854 17  0904 17  1548  11  0941 11  0837   LDL 73 73 61   < >  --  134*   HDL 60 55 54   < >  --  42   TRIG 104 100 105   < >  --  133   ALT  --   --   --   --  88* 132*   CR 1.10 1.04  --    < >  --   --    GFRESTIMATED 78 76  --    < >  --   --    GFRESTBLACK 90 >90  --    < >  --   --    POTASSIUM 4.5 4.0   "--    < >  --   --     < > = values in this interval not displayed.        Reviewed and updated as needed this visit by clinical staff  Tobacco  Allergies  Meds  Problems  Med Hx  Surg Hx  Fam Hx  Soc Hx          Reviewed and updated as needed this visit by Provider  Tobacco  Allergies  Meds  Problems  Med Hx  Surg Hx  Fam Hx            Review of Systems  10 point ROS of systems including Constitutional, Eyes, Respiratory, Cardiovascular, Gastroenterology, Genitourinary, Integumentary, Muscularskeletal, Psychiatric were all negative except for pertinent positives noted in my HPI.      OBJECTIVE:   /67   Pulse 60   Temp 98  F (36.7  C) (Oral)   Resp 16   Ht 1.867 m (6' 1.5\")   Wt 96.6 kg (213 lb)   SpO2 98%   BMI 27.72 kg/m      Physical Exam  GENERAL: healthy, alert and no distress  EYES: Eyes grossly normal to inspection, PERRL and conjunctivae and sclerae normal  HENT: ear canals and TM's normal, nose and mouth without ulcers or lesions  NECK: no adenopathy, no asymmetry, masses, or scars and thyroid normal to palpation  RESP: lungs clear to auscultation - no rales, rhonchi or wheezes  CV: regular rate and rhythm, normal S1 S2, no S3 or S4, no murmur, click or rub, no peripheral edema and peripheral pulses strong  ABDOMEN: soft, nontender, no hepatosplenomegaly, no masses and bowel sounds normal  MS: no gross musculoskeletal defects noted, no edema  SKIN: no suspicious lesions or rashes  NEURO: Normal strength and tone, mentation intact and speech normal  PSYCH: mentation appears normal, affect normal/bright      ASSESSMENT/PLAN:       ICD-10-CM    1. Encounter for routine adult health examination without abnormal findings Z00.00    2. Coronary artery disease involving native coronary artery of native heart without angina pectoris I25.10 amLODIPine (NORVASC) 2.5 MG tablet     Lipid panel reflex to direct LDL Fasting     Basic metabolic panel  (Ca, Cl, CO2, Creat, Gluc, K, Na, BUN)     " Albumin Random Urine Quantitative with Creat Ratio     CARDIO  ADULT REFERRAL   3. Hyperlipidemia LDL goal <100 E78.5 atorvastatin (LIPITOR) 40 MG tablet     Lipid panel reflex to direct LDL Fasting   4. Anxiety state F41.1 buPROPion (WELLBUTRIN XL) 150 MG 24 hr tablet   5. Major depressive disorder, recurrent, moderate (H) F33.1 buPROPion (WELLBUTRIN XL) 150 MG 24 hr tablet   6. Acute bilateral low back pain without sciatica M54.5    7. Colon cancer screening Z12.11 Fecal colorectal cancer screen (FIT)     CPE- Discussed diet, calcium and exercise.  Eyes and Teeth or UTD or recommended f/u.  No immunizations needed today.  See orders below for tests ordered and screening needed.  Discussed colon cancer screening- will do FIT test.    CAD- '12 event, angio okay, mild LAD stenosis, though possibly spasm.  Cardiology rec asa, statin lifelong.  Rec b-blocker, but not tolerated.  Switched to amlodipine.  BP's low, tolerating meds okay, but wondering about long-term need for amlodipine?  He'd like to be off it if possible.  Will lower dose to 2.5mg/d, and refer back to cardiology for longer-terms recs.  He has not been back to see cardiology in several yrs.      Lipids- LDL ranging between 61-92 the last few yrs, wt high at the LDL of 92.  Most recent LDL 73 in 12/17.  Now down ~30 lbs from his max in 1/17 with diet changes and likely with switch from citalopram to wellbutrin.  Continues on lipitor 40mg/d, no known se's.  Will recheck fasting lipids today.    MDD/Anxiety- good control of sx's on wellbutrin XL 150mg/d.  No se's.  Will cont.   Cont q6mo follow-up.    Low back pain- bad episode this past winter- out of commission for a few days.  Flexeril, ibuprofen and tylenol, heat all minimally effective.  Rec regular exercise and core strengthening.  KAT PT if sx's recur or if getting hurt getting exercise.      Return in about 6 months (around 10/24/2019) for Routine Visit/Chronic Cares, Depression  "follow-up.      COUNSELING:   Reviewed preventive health counseling, as reflected in patient instructions  Special attention given to:        Regular exercise       Healthy diet/nutrition       Colon cancer screening       Osteoporosis Prevention/Bone Health    BP Readings from Last 1 Encounters:   04/24/19 104/67     Estimated body mass index is 27.72 kg/m  as calculated from the following:    Height as of this encounter: 1.867 m (6' 1.5\").    Weight as of this encounter: 96.6 kg (213 lb).      Weight management plan: Discussed healthy diet and exercise guidelines     reports that he quit smoking about 6 years ago. He has never used smokeless tobacco.      Counseling Resources:  ATP IV Guidelines  Pooled Cohorts Equation Calculator  FRAX Risk Assessment  ICSI Preventive Guidelines  Dietary Guidelines for Americans, 2010  USDA's MyPlate  ASA Prophylaxis  Lung CA Screening    Danika Hargrove MD  [facility]  "

## 2019-04-24 NOTE — NURSING NOTE
"Chief Complaint   Patient presents with     Physical     /67   Pulse 60   Temp 98  F (36.7  C) (Oral)   Resp 16   Ht 1.867 m (6' 1.5\")   Wt 96.6 kg (213 lb)   SpO2 98%   BMI 27.72 kg/m   Estimated body mass index is 27.72 kg/m  as calculated from the following:    Height as of this encounter: 1.867 m (6' 1.5\").    Weight as of this encounter: 96.6 kg (213 lb).  bp completed using cuff size: large       Health Maintenance addressed:  Colonoscopy/FIT    Pt will schedule  appt.    Renetta Gayle MA     "

## 2019-04-24 NOTE — LETTER
April 25, 2019      Rob Beltre  [address]   [facility] [address]        Dear ,    We are writing to inform you of your test results.    Here are your lab results from your recent visit...   -Your basic metabolic panel (which includes electrolyte levels, blood sugar level and kidney function tests) looks very good.   -Your urine albumin level (which is the urine test that can signal signs of early chronic kidney disease if elevated to >30) is very low which is also good.   -Your cholesterol panel looks good with a low LDL (the bad cholesterol) and a higher HDL (the good cholesterol).     Resulted Orders   Lipid panel reflex to direct LDL Fasting   Result Value Ref Range    Cholesterol 154 <200 mg/dL    Triglycerides 104 <150 mg/dL      Comment:      Fasting specimen    HDL Cholesterol 60 >39 mg/dL    LDL Cholesterol Calculated 73 <100 mg/dL      Comment:      Desirable:       <100 mg/dl    Non HDL Cholesterol 94 <130 mg/dL   Basic metabolic panel  (Ca, Cl, CO2, Creat, Gluc, K, Na, BUN)   Result Value Ref Range    Sodium 139 133 - 144 mmol/L    Potassium 4.5 3.4 - 5.3 mmol/L    Chloride 107 94 - 109 mmol/L    Carbon Dioxide 24 20 - 32 mmol/L    Anion Gap 8 3 - 14 mmol/L    Glucose 95 70 - 99 mg/dL      Comment:      Fasting specimen    Urea Nitrogen 20 7 - 30 mg/dL    Creatinine 1.10 0.66 - 1.25 mg/dL    GFR Estimate 78 >60 mL/min/[1.73_m2]      Comment:      Non  GFR Calc  Starting 12/18/2018, serum creatinine based estimated GFR (eGFR) will be   calculated using the Chronic Kidney Disease Epidemiology Collaboration   (CKD-EPI) equation.      GFR Estimate If Black 90 >60 mL/min/[1.73_m2]      Comment:       GFR Calc  Starting 12/18/2018, serum creatinine based estimated GFR (eGFR) will be   calculated using the Chronic Kidney Disease Epidemiology Collaboration   (CKD-EPI) equation.      Calcium 9.3 8.5 - 10.1 mg/dL   Albumin Random Urine Quantitative with Creat  Ratio   Result Value Ref Range    Creatinine Urine 186 mg/dL    Albumin Urine mg/L 8 mg/L    Albumin Urine mg/g Cr 4.44 0 - 17 mg/g Cr       If you have any questions or concerns, please call the clinic at the number listed above.       Sincerely,        Danika Hargrove MD

## 2019-04-24 NOTE — PATIENT INSTRUCTIONS
Plan-  Blood pressure has looked good, on lower end for a bit.  Will lower the amlodipine to 2.5mg/day and refer to cardiology.  Continue great work on diet.    Cardiology referral review and questions...  CAD event in '12 (angiogram with just mild LAD stenosis, thought possible coronary spasm).  Nothing since.  Recs at the time, lifelong asa, statin.  B-blocker switched to amlodipine due to se's of fatigue on the b-blocker.  Longer-term recs?  Does he need to stay on amlodipine if BP's low?    Exercise-   Look back into 7-minute work-out.  For more, look into [employer]- tons of options for work-outs at home.  Start slow.      Preventive Health Recommendations  Male Ages 50 - 64    Yearly exam:             See your health care provider every year in order to  o   Review health changes.   o   Discuss preventive care.    o   Review your medicines if your doctor has prescribed any.     Have a cholesterol test every 5 years, or more frequently if you are at risk for high cholesterol/heart disease.     Have a diabetes test (fasting glucose) every three years. If you are at risk for diabetes, you should have this test more often.     Have a colonoscopy at age 50, or have a yearly FIT test (stool test). These exams will check for colon cancer.      Talk with your health care provider about whether or not a prostate cancer screening test (PSA) is right for you.    You should be tested each year for STDs (sexually transmitted diseases), if you re at risk.     Shots: Get a flu shot each year. Get a tetanus shot every 10 years.     Nutrition:    Eat at least 5 servings of fruits and vegetables daily.     Eat whole-grain bread, whole-wheat pasta and brown rice instead of white grains and rice.     Get adequate Calcium and Vitamin D.     Lifestyle    Exercise for at least 150 minutes a week (30 minutes a day, 5 days a week). This will help you control your weight and prevent disease.     Limit alcohol to one drink per  day.     No smoking.     Wear sunscreen to prevent skin cancer.     See your dentist every six months for an exam and cleaning.     See your eye doctor every 1 to 2 years.

## 2019-04-25 NOTE — RESULT ENCOUNTER NOTE
Please send letter below with copy of results.  Thanks! CW    Here are your lab results from your recent visit...  -Your basic metabolic panel (which includes electrolyte levels, blood sugar level and kidney function tests) looks very good.  -Your urine albumin level (which is the urine test that can signal signs of early chronic kidney disease if elevated to >30) is very low which is also good.  -Your cholesterol panel looks good with a low LDL (the bad cholesterol) and a higher HDL (the good cholesterol).     Please let me know if you have any questions.  Best,   Rai Hargrove MD

## 2019-05-04 PROCEDURE — 82274 ASSAY TEST FOR BLOOD FECAL: CPT | Performed by: FAMILY MEDICINE

## 2019-05-11 LAB — HEMOCCULT STL QL IA: NEGATIVE

## 2019-05-13 DIAGNOSIS — Z12.11 COLON CANCER SCREENING: ICD-10-CM

## 2019-05-13 NOTE — RESULT ENCOUNTER NOTE
Please send letter below with copy of results.  Thanks! CW    Here are your lab results from your recent visit...  -Your FIT test (the screening test for colon cancer by testing for blood in your stool), was negative.  We should continue to check this yearly (unless you have a colonoscopy instead).      Please let me know if you have any questions.  Best,   Rai Hargrove MD

## 2019-10-24 DIAGNOSIS — I25.10 CORONARY ARTERY DISEASE INVOLVING NATIVE CORONARY ARTERY OF NATIVE HEART WITHOUT ANGINA PECTORIS: ICD-10-CM

## 2019-10-24 RX ORDER — AMLODIPINE BESYLATE 2.5 MG/1
TABLET ORAL
Qty: 30 TABLET | Refills: 0 | Status: SHIPPED | OUTPATIENT
Start: 2019-10-24 | End: 2019-11-19

## 2019-10-24 NOTE — TELEPHONE ENCOUNTER
"Medication is being filled for 1 time refill only due to:  Patient needs to be seen because due for 6 month f/u .   Joy CABALLERO RN    Last Written Prescription Date:  4/24/2019  Last Fill Quantity: 90,  # refills: 1   Last office visit: 4/24/2019 with prescribing provider:     Future Office Visit:    Requested Prescriptions   Pending Prescriptions Disp Refills     amLODIPine (NORVASC) 2.5 MG tablet [Pharmacy Med Name: AMLODIPINE BESYLATE 2.5 MG TAB] 90 tablet 1     Sig: TAKE 1 TABLET BY MOUTH EVERY DAY       Calcium Channel Blockers Protocol  Passed - 10/24/2019  2:28 AM        Passed - Blood pressure under 140/90 in past 12 months     BP Readings from Last 3 Encounters:   04/24/19 104/67   09/05/18 113/76   12/13/17 104/66                 Passed - Recent (12 mo) or future (30 days) visit within the authorizing provider's specialty     Patient has had an office visit with the authorizing provider or a provider within the authorizing providers department within the previous 12 mos or has a future within next 30 days. See \"Patient Info\" tab in inbasket, or \"Choose Columns\" in Meds & Orders section of the refill encounter.              Passed - Medication is active on med list        Passed - Patient is age 18 or older        Passed - Normal serum creatinine on file in past 12 months     Recent Labs   Lab Test 04/24/19  0854   CR 1.10             "

## 2019-11-04 DIAGNOSIS — I25.10 CORONARY ARTERY DISEASE INVOLVING NATIVE CORONARY ARTERY OF NATIVE HEART WITHOUT ANGINA PECTORIS: ICD-10-CM

## 2019-11-05 RX ORDER — AMLODIPINE BESYLATE 2.5 MG/1
TABLET ORAL
Start: 2019-11-05

## 2019-11-05 NOTE — TELEPHONE ENCOUNTER
"Last Written Prescription Date:  10/24/19  Last Fill Quantity: 30,  # refills: 0   Last office visit: 4/24/2019 with prescribing provider:  4/24/19   Future Office Visit:    Requested Prescriptions   Pending Prescriptions Disp Refills     amLODIPine (NORVASC) 2.5 MG tablet [Pharmacy Med Name: AMLODIPINE BESYLATE 2.5 MG TAB] 90 tablet 1     Sig: TAKE 1 TABLET BY MOUTH EVERY DAY       Calcium Channel Blockers Protocol  Passed - 11/4/2019 10:24 PM        Passed - Blood pressure under 140/90 in past 12 months     BP Readings from Last 3 Encounters:   04/24/19 104/67   09/05/18 113/76   12/13/17 104/66                 Passed - Recent (12 mo) or future (30 days) visit within the authorizing provider's specialty     Patient has had an office visit with the authorizing provider or a provider within the authorizing providers department within the previous 12 mos or has a future within next 30 days. See \"Patient Info\" tab in inbasket, or \"Choose Columns\" in Meds & Orders section of the refill encounter.              Passed - Medication is active on med list        Passed - Patient is age 18 or older        Passed - Normal serum creatinine on file in past 12 months     Recent Labs   Lab Test 04/24/19  0854   CR 1.10               "

## 2019-11-06 ENCOUNTER — TELEPHONE (OUTPATIENT)
Dept: FAMILY MEDICINE | Facility: CLINIC | Age: 49
End: 2019-11-06

## 2019-11-15 DIAGNOSIS — F33.1 MAJOR DEPRESSIVE DISORDER, RECURRENT, MODERATE (H): ICD-10-CM

## 2019-11-15 DIAGNOSIS — F41.1 ANXIETY STATE: ICD-10-CM

## 2019-11-15 RX ORDER — BUPROPION HYDROCHLORIDE 150 MG/1
TABLET ORAL
Qty: 30 TABLET | Refills: 0 | Status: SHIPPED | OUTPATIENT
Start: 2019-11-15 | End: 2019-11-19

## 2019-11-15 NOTE — TELEPHONE ENCOUNTER
"Requested Prescriptions   Pending Prescriptions Disp Refills     buPROPion (WELLBUTRIN XL) 150 MG 24 hr tablet [Pharmacy Med Name: BUPROPION HCL  MG TABLET] 90 tablet 1     Sig: TAKE 1 TABLET (150 MG) BY MOUTH EVERY MORNING  Last Written Prescription Date:  4/24/19  Last Fill Quantity: 90 tab,  # refills: 1   Last office visit: 4/24/2019 with prescribing provider:  Delvin   Future Office Visit:   Next 5 appointments (look out 90 days)    Nov 19, 2019  1:30 PM [facility]  Office Visit with Danika Hargrove MD  [facility] [address] [facility] [address] [phone]             SSRIs Protocol Failed - 11/15/2019  2:28 AM        Failed - PHQ-9 score less than 5 in past 6 months     Please review last PHQ-9 score.   PHQ-9 SCORE 4/14/2017 12/13/2017 9/5/2018   PHQ-9 Total Score - - -   PHQ-9 Total Score 0 3 1     YOGESH-7 SCORE 4/14/2017 12/13/2017 9/5/2018   Total Score - - -   Total Score 0 3 1           Passed - Medication is Bupropion     If the medication is Bupropion (Wellbutrin), and the patient is taking for smoking cessation; OK to refill.          Passed - Medication is active on med list        Passed - Patient is age 18 or older        Passed - Recent (6 mo) or future (30 days) visit within the authorizing provider's specialty     Patient had office visit in the last 6 months or has a visit in the next 30 days with authorizing provider or within the authorizing provider's specialty.  See \"Patient Info\" tab in inbasket, or \"Choose Columns\" in Meds & Orders section of the refill encounter.               "

## 2019-11-19 ENCOUNTER — OFFICE VISIT (OUTPATIENT)
Dept: FAMILY MEDICINE | Facility: CLINIC | Age: 49
End: 2019-11-19
Payer: COMMERCIAL

## 2019-11-19 VITALS
HEIGHT: 74 IN | BODY MASS INDEX: 27.94 KG/M2 | TEMPERATURE: 98.2 F | DIASTOLIC BLOOD PRESSURE: 72 MMHG | SYSTOLIC BLOOD PRESSURE: 113 MMHG | OXYGEN SATURATION: 96 % | HEART RATE: 63 BPM | WEIGHT: 217.7 LBS | RESPIRATION RATE: 15 BRPM

## 2019-11-19 DIAGNOSIS — F33.1 MAJOR DEPRESSIVE DISORDER, RECURRENT, MODERATE (H): ICD-10-CM

## 2019-11-19 DIAGNOSIS — E78.5 HYPERLIPIDEMIA LDL GOAL <100: ICD-10-CM

## 2019-11-19 DIAGNOSIS — I25.10 CORONARY ARTERY DISEASE INVOLVING NATIVE CORONARY ARTERY OF NATIVE HEART WITHOUT ANGINA PECTORIS: Primary | ICD-10-CM

## 2019-11-19 DIAGNOSIS — F41.1 ANXIETY STATE: ICD-10-CM

## 2019-11-19 DIAGNOSIS — N50.9 SCROTAL SKIN LESION: ICD-10-CM

## 2019-11-19 PROCEDURE — 99214 OFFICE O/P EST MOD 30 MIN: CPT | Performed by: FAMILY MEDICINE

## 2019-11-19 PROCEDURE — 96127 BRIEF EMOTIONAL/BEHAV ASSMT: CPT | Performed by: FAMILY MEDICINE

## 2019-11-19 RX ORDER — ATORVASTATIN CALCIUM 40 MG/1
TABLET, FILM COATED ORAL
Qty: 90 TABLET | Refills: 1 | Status: SHIPPED | OUTPATIENT
Start: 2019-11-19 | End: 2020-05-15

## 2019-11-19 RX ORDER — BUPROPION HYDROCHLORIDE 150 MG/1
TABLET ORAL
Qty: 90 TABLET | Refills: 1 | Status: SHIPPED | OUTPATIENT
Start: 2019-11-19 | End: 2020-05-15

## 2019-11-19 RX ORDER — AMLODIPINE BESYLATE 2.5 MG/1
2.5 TABLET ORAL DAILY
Qty: 90 TABLET | Refills: 1 | Status: SHIPPED | OUTPATIENT
Start: 2019-11-19 | End: 2020-05-29

## 2019-11-19 ASSESSMENT — PATIENT HEALTH QUESTIONNAIRE - PHQ9
5. POOR APPETITE OR OVEREATING: NOT AT ALL
SUM OF ALL RESPONSES TO PHQ QUESTIONS 1-9: 0

## 2019-11-19 ASSESSMENT — ANXIETY QUESTIONNAIRES
6. BECOMING EASILY ANNOYED OR IRRITABLE: NOT AT ALL
GAD7 TOTAL SCORE: 0
3. WORRYING TOO MUCH ABOUT DIFFERENT THINGS: NOT AT ALL
IF YOU CHECKED OFF ANY PROBLEMS ON THIS QUESTIONNAIRE, HOW DIFFICULT HAVE THESE PROBLEMS MADE IT FOR YOU TO DO YOUR WORK, TAKE CARE OF THINGS AT HOME, OR GET ALONG WITH OTHER PEOPLE: NOT DIFFICULT AT ALL
7. FEELING AFRAID AS IF SOMETHING AWFUL MIGHT HAPPEN: NOT AT ALL
5. BEING SO RESTLESS THAT IT IS HARD TO SIT STILL: NOT AT ALL
1. FEELING NERVOUS, ANXIOUS, OR ON EDGE: NOT AT ALL
2. NOT BEING ABLE TO STOP OR CONTROL WORRYING: NOT AT ALL

## 2019-11-19 ASSESSMENT — MIFFLIN-ST. JEOR: SCORE: 1904.29

## 2019-11-19 NOTE — NURSING NOTE
"Chief Complaint   Patient presents with     RECHECK     /72   Pulse 63   Temp 98.2  F (36.8  C) (Oral)   Resp 15   Ht 1.867 m (6' 1.5\")   Wt 98.7 kg (217 lb 11.2 oz)   SpO2 96%   BMI 28.33 kg/m   Estimated body mass index is 28.33 kg/m  as calculated from the following:    Height as of this encounter: 1.867 m (6' 1.5\").    Weight as of this encounter: 98.7 kg (217 lb 11.2 oz).  Medication Reconciliation: complete        Health Maintenance Due Pending Provider Review:  PHQ9    Completed today.    Antonina Saez MA  [facility]  [phone]  "

## 2019-11-19 NOTE — PROGRESS NOTES
Subjective     Rob Beltre is a 49 year old male who presents to clinic today for the following health issues:    HPI   Hyperlipidemia/CAD Follow-Up    Are you having any of the following symptoms? (Select all that apply)  No complaints of shortness of breath, chest pain or pressure.  No increased sweating or nausea with activity.  No left-sided neck or arm pain.  No complaints of pain in calves when walking 1-2 blocks.    Are you regularly taking any medication or supplement to lower your cholesterol?   Yes- Lipitor    Are you having muscle aches or other side effects that you think could be caused by your cholesterol lowering medication?  No    How often do you take nitroglycerin? Never    Do you take an aspirin every day? No    BP- still fine on the lower dose amlodipine 2.5mg/d (from 5mg/d).  He did stop taking asa- just ran out and didn't restart.  Was told to take asa life-long after 7/12 CAD event (possible spasm).    BP was always fine prior to CAD event.  Lipids were high in 180-190 as far back as '08, even when wt was lower than it is now.    Diet/exercise- still much better, still more vegetables and fruits, better meat, better portions.  Does think there are some cookies and other things slipping back in.  When he got down to ~210 lbs, he had completely cut out sugars and grains except one meal a week.  He did feel better on that plan.    Exercise- likes yard-work - except in winter.  Doesn't really like exercise otherwise.  Drives to work, does take 2 flights to work- 3x/day or more. Sitting most of the day other than moving around and stretching his legs ~q1-2 yrs.      Depression and Anxiety Follow-Up    How are you doing with your depression since your last visit? No change    How are you doing with your anxiety since your last visit?  No change    Are you having other symptoms that might be associated with depression or anxiety? No    Have you had a significant life event? No     Do you have any  concerns with your use of alcohol or other drugs? No    Mood is pretty good, though he's coming into the time of year that is tougher for him.  Has 'reveal lights'- where he sits.    He does Vit D 2000 units daily.      Other concerns-   Towards the back of his scrotum, there is something like a little ball, almost like a pimple, but really hard.  Almost feels like a calcium build-up.      Social History     Tobacco Use     Smoking status: Former Smoker     Last attempt to quit: 2012     Years since quittin.4     Smokeless tobacco: Never Used     Tobacco comment: stopped after chest pain ER visit in    Substance Use Topics     Alcohol use: Yes     Comment: 1-2 drinks every few wks     Drug use: No     Comment: Sober from marijuana since 05     PHQ 2017   PHQ-9 Total Score 3 1 0   Q9: Thoughts of better off dead/self-harm past 2 weeks Not at all Not at all Not at all     YOGESH-7 SCORE 2017   Total Score - - -   Total Score 3 1 0         How many servings of fruits and vegetables do you eat daily?  2-3    On average, how many sweetened beverages do you drink each day (soda, juice, sweet tea, etc)?   0    How many days per week do you miss taking your medication? 0        Patient Active Problem List   Diagnosis     Allergic rhinitis due to other allergen     Anxiety state     Major depressive disorder, recurrent, moderate (H)     Hyperlipidemia LDL goal <100     GERD (gastroesophageal reflux disease)     CAD (coronary artery disease)     Nonspecific abnormal electrocardiogram (ECG) (EKG)     Past Surgical History:   Procedure Laterality Date     CARDIAC ECHO ([facility])      JOHN Peoples, borderline concentric LVH, EF 55%     NO HISTORY OF SURGERY         Social History     Tobacco Use     Smoking status: Former Smoker     Last attempt to quit: 2012     Years since quittin.4     Smokeless tobacco: Never Used     Tobacco comment: stopped after  chest pain ER visit in 7/12   Substance Use Topics     Alcohol use: Yes     Comment: 1-2 drinks every few wks     Family History   Problem Relation Age of Onset     Depression Mother      Gynecology Mother         Hysterectomy     Heart Disease Father         MI around age 60     Allergies Father      Lipids Father      Prostate Cancer Father      Genitourinary Problems Maternal Grandfather         Unsure of what     Respiratory Maternal Grandfather         Breathing problems from farming     Heart Disease Paternal Grandfather         MI around 65         Current Outpatient Medications   Medication Sig Dispense Refill     amLODIPine (NORVASC) 2.5 MG tablet Take 1 tablet (2.5 mg) by mouth daily 90 tablet 1     aspirin 81 MG tablet Take 1 tablet by mouth daily. 90 tablet 3     atorvastatin (LIPITOR) 40 MG tablet TAKE 1 TABLET (40 MG) BY MOUTH DAILY 90 tablet 1     buPROPion (WELLBUTRIN XL) 150 MG 24 hr tablet Take one tab daily 90 tablet 1     cholecalciferol (VITAMIN D3) 1000 UNIT tablet Take 2 tablets (2,000 Units) by mouth daily 100 tablet 3     cyclobenzaprine (FLEXERIL) 10 MG tablet Take 0.5-1 tablets (5-10 mg) by mouth 3 times daily as needed for muscle spasms (Patient not taking: Reported on 11/19/2019) 30 tablet 1     Allergies   Allergen Reactions     Dust Mite Extract      Recent Labs   Lab Test 04/24/19  0854 12/13/17  0904 04/07/17  1548  09/27/11  0941 09/22/11  0837   LDL 73 73 61   < >  --  134*   HDL 60 55 54   < >  --  42   TRIG 104 100 105   < >  --  133   ALT  --   --   --   --  88* 132*   CR 1.10 1.04  --    < >  --   --    GFRESTIMATED 78 76  --    < >  --   --    GFRESTBLACK 90 >90  --    < >  --   --    POTASSIUM 4.5 4.0  --    < >  --   --     < > = values in this interval not displayed.      BP Readings from Last 3 Encounters:   11/19/19 113/72   04/24/19 104/67   09/05/18 113/76    Wt Readings from Last 3 Encounters:   11/19/19 98.7 kg (217 lb 11.2 oz)   04/24/19 96.6 kg (213 lb)   09/05/18  "94.8 kg (208 lb 14.4 oz)               Reviewed and updated as needed this visit by Provider  Tobacco  Allergies  Meds  Problems  Med Hx  Surg Hx  Fam Hx         Review of Systems   ROS COMP: Constitutional, HEENT, cardiovascular, pulmonary, gi and gu systems are negative, except as otherwise noted.        Objective    /72   Pulse 63   Temp 98.2  F (36.8  C) (Oral)   Resp 15   Ht 1.867 m (6' 1.5\")   Wt 98.7 kg (217 lb 11.2 oz)   SpO2 96%   BMI 28.33 kg/m    Body mass index is 28.33 kg/m .  Physical Exam   GENERAL APPEARANCE: healthy, alert and no distress     EYES: PERRL, sclera clear     HENT: nose and mouth without ulcers or lesions     NECK: no adenopathy, no asymmetry, masses, or scars and thyroid normal to palpation     RESP: lungs clear to auscultation - no rales, rhonchi or wheezes     CV: regular rates and rhythm, normal S1 S2, no S3 or S4 and no murmur, click or rub      Abdomen: soft, nontender, no HSM or masses and bowel sounds normal     Ext: warm, dry, no edema      Psych: full range affect, normal speech and grooming, judgement and insight intact           Assessment & Plan       ICD-10-CM    1. Coronary artery disease involving native coronary artery of native heart without angina pectoris I25.10 CARDIO  ADULT REFERRAL     amLODIPine (NORVASC) 2.5 MG tablet     Lipid panel reflex to direct LDL Fasting     **Basic metabolic panel FUTURE anytime     Albumin Random Urine Quantitative with Creat Ratio   2. Hyperlipidemia LDL goal <100 E78.5 atorvastatin (LIPITOR) 40 MG tablet     Lipid panel reflex to direct LDL Fasting   3. Anxiety state F41.1 EMOTIONAL / BEHAVIORAL ASSESSMENT     buPROPion (WELLBUTRIN XL) 150 MG 24 hr tablet   4. Major depressive disorder, recurrent, moderate (H) F33.1 EMOTIONAL / BEHAVIORAL ASSESSMENT     buPROPion (WELLBUTRIN XL) 150 MG 24 hr tablet   5. Scrotal skin lesion N50.9      CAD/Lipids/medication review-   Pt is doing well on norvasc 2.5mg/d " "(lowered dose at last visit 6 months ago)- BP has never been an issue.  Lipids- in good control with lipitor 40mg/d, no se's, will cont.  He did stop the asa- just ran out and didn't restart.  He didn't f/u with cardiology after last visit- he will try and schedule this time.  He will restart the asa at this point, but wants to discuss with cardiology the long-term need for norvasc and asa.  He'll cont his good work on diet, and get back into his even better diet habits when wt was lower.  Exercise still harder for him- doesn't like it, but will try to add walking/stairs more into his day.    Anxiety/MDD- good control with wellbutrin XL 150mg/d.  Does have seasonal sx's- on Vit D 2000 units/day, and sits next to 'reveal lights'.  Will look into the 10,000 lux lights.  Cont q6mo f/u.    Return in about 6 months (around 5/19/2020) for Physical Exam, Fasting labs prior.     BMI:   Estimated body mass index is 28.33 kg/m  as calculated from the following:    Height as of this encounter: 1.867 m (6' 1.5\").    Weight as of this encounter: 98.7 kg (217 lb 11.2 oz).   Weight management plan: Discussed healthy diet and exercise guidelines      Danika Hargrove MD  [facility]      "

## 2019-11-20 ASSESSMENT — ANXIETY QUESTIONNAIRES: GAD7 TOTAL SCORE: 0

## 2019-12-10 ENCOUNTER — DOCUMENTATION ONLY (OUTPATIENT)
Dept: CARDIOLOGY | Facility: CLINIC | Age: 49
End: 2019-12-10

## 2019-12-10 NOTE — PROGRESS NOTES
Records received from [facility] discharge notes including heart cath and Echo from 7/1/2012. Being seen here for New patient, CAD follow up per PMD on 12/19/19. Sent to HIM to be scanned.

## 2019-12-19 ENCOUNTER — OFFICE VISIT (OUTPATIENT)
Dept: CARDIOLOGY | Facility: CLINIC | Age: 49
End: 2019-12-19
Attending: FAMILY MEDICINE
Payer: COMMERCIAL

## 2019-12-19 VITALS
BODY MASS INDEX: 28.26 KG/M2 | HEIGHT: 74 IN | DIASTOLIC BLOOD PRESSURE: 74 MMHG | HEART RATE: 74 BPM | SYSTOLIC BLOOD PRESSURE: 124 MMHG | WEIGHT: 220.2 LBS

## 2019-12-19 DIAGNOSIS — I25.10 CORONARY ARTERY DISEASE INVOLVING NATIVE CORONARY ARTERY OF NATIVE HEART WITHOUT ANGINA PECTORIS: Primary | ICD-10-CM

## 2019-12-19 PROCEDURE — 99203 OFFICE O/P NEW LOW 30 MIN: CPT | Performed by: INTERNAL MEDICINE

## 2019-12-19 PROCEDURE — 93000 ELECTROCARDIOGRAM COMPLETE: CPT | Performed by: INTERNAL MEDICINE

## 2019-12-19 SDOH — HEALTH STABILITY: MENTAL HEALTH: HOW OFTEN DO YOU HAVE 6 OR MORE DRINKS ON ONE OCCASION?: WEEKLY

## 2019-12-19 SDOH — HEALTH STABILITY: MENTAL HEALTH: HOW MANY STANDARD DRINKS CONTAINING ALCOHOL DO YOU HAVE ON A TYPICAL DAY?: 1 OR 2

## 2019-12-19 ASSESSMENT — MIFFLIN-ST. JEOR: SCORE: 1915.63

## 2019-12-19 NOTE — LETTER
12/19/2019    Danika Hargrove MD  [address] [facility] [address]  [place] [address]    RE: Rob Beltre       Dear Colleague,    I had the pleasure of seeing Rob Beltre in the [facility].    HPI and Plan:   It is a pleasure for me to see Mr. Beltre today who is here for cardiac evaluation.  He has an interesting cardiac history which started in 2012.  He recalls doing some yard work and barbecuing on a hot humid summer day.  He then suddenly experienced bilateral arm pain and chest pain.  He was taken to the emergency room at [facility].  His EKG was described as having ST segment depression.  He had a troponin peak of 48.27.  Echocardiography demonstrated low normal ejection fraction.  Coronary angiography showed only mild nonobstructive coronary artery disease.  He was given a presumptive diagnosis of infarction due to coronary artery spasm with a less likely diagnosis of acute myocarditis.  He has been fine since then with absolutely no recurrence of chest pain.  He feels well.  He has continued on aspirin, simvastatin and amlodipine and his medications are well-tolerated with no side effects.  His most recent lipid profile is [place] excellent with an HDL of 60 LDL of 73 and triglycerides of 104.  Cardiac examination is unremarkable.  Today's EKG showed sinus rhythm with small Q waves seen over most of his leads.  I think this may be due to intraventricular conduction delay rather than being true Q waves.  He has nonspecific ST segment changes.    He was a smoker at the time of his cardiac event and he has since given up.  He denies any history of drug or alcohol abuse.  His family history was said to be positive but on further questioning his father had heart disease diagnosed in his 60s and is still doing well at the age of 80.  From my point of view his family history is negative.    I think it is very hard to say what caused this gentlemen's myocardial  infarction or myocardial necrosis back in 2012.  Coronary artery spasm is a little less likely given the fact that he had ST segment depression rather than elevation on his EKG.  I still think acute myocarditis is a distinct possibility.  In any case he is doing well right now and I do not think further cardiac evaluation would be needed.  As his medications are well-tolerated and I could not absolutely exclude the possibility of coronary artery spasm I have asked him to continue his medications.  Mild coronary artery disease would not be entirely unexpected in a gentleman who was 42 at the time and smoking cigarettes.  As such I think he could do well to continue with aspirin and simvastatin.  I have provisionally arranged to see him again in 2 years time for further follow-up.    Orders Placed This Encounter   Procedures     EKG 12-lead complete w/read - Clinics (performed today)       No orders of the defined types were placed in this encounter.      Encounter Diagnosis   Name Primary?     Coronary artery disease involving native coronary artery of native heart without angina pectoris Yes       CURRENT MEDICATIONS:  Current Outpatient Medications   Medication Sig Dispense Refill     amLODIPine (NORVASC) 2.5 MG tablet Take 1 tablet (2.5 mg) by mouth daily 90 tablet 1     aspirin 81 MG tablet Take 1 tablet by mouth daily. 90 tablet 3     atorvastatin (LIPITOR) 40 MG tablet TAKE 1 TABLET (40 MG) BY MOUTH DAILY 90 tablet 1     buPROPion (WELLBUTRIN XL) 150 MG 24 hr tablet Take one tab daily 90 tablet 1     cholecalciferol (VITAMIN D3) 1000 UNIT tablet Take 2 tablets (2,000 Units) by mouth daily 100 tablet 3     cyclobenzaprine (FLEXERIL) 10 MG tablet Take 0.5-1 tablets (5-10 mg) by mouth 3 times daily as needed for muscle spasms 30 tablet 1       ALLERGIES     Allergies   Allergen Reactions     Dust Mite Extract        PAST MEDICAL HISTORY:  Past Medical History:   Diagnosis Date     Active smoker 10/24/2011      Allergic rhinitis due to other allergen     resolved     Anxiety state, unspecified     citalopram, stopped in 3/07, restarted in      Hyperlipidemia LDL goal < 130 2008    simvastatin     Major depressive disorder, recurrent, moderate (H)     citalopram helping, back on since        PAST SURGICAL HISTORY:  Past Surgical History:   Procedure Laterality Date     CARDIAC ECHO ([facility])      JOHN Peoples, borderline concentric LVH, EF 55%     NO HISTORY OF SURGERY         FAMILY HISTORY:  Family History   Problem Relation Age of Onset     Depression Mother      Gynecology Mother         Hysterectomy     Heart Disease Father         MI around age 60     Allergies Father      Lipids Father      Prostate Cancer Father      Genitourinary Problems Maternal Grandfather         Unsure of what     Respiratory Maternal Grandfather         Breathing problems from farming     Heart Disease Paternal Grandfather         MI around 65       SOCIAL HISTORY:  Social History     Socioeconomic History     Marital status:      Spouse name: None     Number of children: None     Years of education: None     Highest education level: None   Occupational History     Comment: [employer]     Employer: SELF   Social Needs     Financial resource strain: None     Food insecurity:     Worry: None     Inability: None     Transportation needs:     Medical: None     Non-medical: None   Tobacco Use     Smoking status: Former Smoker     Packs/day: 0.25     Years: 20.00     Pack years: 5.00     Types: Cigarettes     Last attempt to quit: 2012     Years since quittin.4     Smokeless tobacco: Never Used     Tobacco comment: stopped after chest pain ER visit in    Substance and Sexual Activity     Alcohol use: Yes     Drinks per session: 1 or 2     Binge frequency: Weekly     Comment: 1-2 drinks week     Drug use: No     Comment: Sober from marijuana since 05     Sexual activity: Yes     Partners: Female      Comment:  9/10, going well   Lifestyle     Physical activity:     Days per week: None     Minutes per session: None     Stress: None   Relationships     Social connections:     Talks on phone: None     Gets together: None     Attends Hoahaoism service: None     Active member of club or organization: None     Attends meetings of clubs or organizations: None     Relationship status: None     Intimate partner violence:     Fear of current or ex partner: None     Emotionally abused: None     Physically abused: None     Forced sexual activity: None   Other Topics Concern      Service No     Blood Transfusions No     Caffeine Concern No     Occupational Exposure No     Hobby Hazards No     Sleep Concern Yes     Stress Concern Yes     Weight Concern Yes     Special Diet No     Back Care No     Exercise No     Bike Helmet No     Seat Belt Yes     Self-Exams No   Social History Narrative     for past 1 year. No kids. 2 cats.            Social Documentation:        Balanced Diet: YES- Moderate    Calcium intake: 3-4 per day    Caffeine: none per day    Exercise:  type of activity walking ;  3-4 times per week    Sunscreen: Yes    Seatbelts:  Yes    Self Testicular Exam: Yes    Physical/Emotional/Sexual Abuse: No    Do you feel safe in your environment? Yes        Cholesterol screen up to date: Yes- will check today, fasting    Eye Exam up to date: Yes    Dental Exam up to date: Yes    Pap smear up to date: Does Not Apply    Mammogram up to date: Yes    Dexa Scan up to date: Does Not Apply    Colonoscopy up to date: Does Not Apply    Immunizations up to date: No TDAP ordered, pt is unsure    Glucose screen if over 40:  Yes 02/18/08        Trisha Gregory MA    02/18/08       Review of Systems:  Skin:  Negative     Eyes:  Positive for glasses  ENT:  Negative    Respiratory:  Negative    Cardiovascular:  Negative;palpitations;chest pain;dizziness;lightheadedness;syncope or  "near-syncope;cyanosis;fatigue;edema    Gastroenterology: Positive for heartburn  Genitourinary:  Negative    Musculoskeletal:  Positive for back pain  Neurologic:  Negative    Psychiatric:  Positive for depression  Heme/Lymph/Imm:  Positive for easy bruising  Endocrine:  Negative      Physical Exam:  Vitals: /74 (BP Location: Left arm, Cuff Size: Adult Large)   Pulse 74   Ht 1.867 m (6' 1.5\")   Wt 99.9 kg (220 lb 3.2 oz)   BMI 28.66 kg/m       Constitutional:  cooperative, alert and oriented, well developed, well nourished, in no acute distress        Skin:  warm and dry to the touch, no apparent skin lesions or masses noted          Head:  normocephalic, no masses or lesions        Eyes:  pupils equal and round, conjunctivae and lids unremarkable, sclera white, no xanthalasma, EOMS intact, no nystagmus        Lymph:No Cervical lymphadenopathy present     ENT:           Neck:  carotid pulses are full and equal bilaterally, JVP normal, no carotid bruit        Respiratory:  normal breath sounds, clear to auscultation, normal A-P diameter, normal symmetry, normal respiratory excursion, no use of accessory muscles         Cardiac: regular rhythm, normal S1/S2, no S3 or S4, apical impulse not displaced, no murmurs, gallops or rubs                pulses full and equal, no bruits auscultated                                        GI:  abdomen soft, non-tender, BS normoactive, no mass, no HSM, no bruits        Extremities and Muscular Skeletal:  no deformities, clubbing, cyanosis, erythema observed              Neurological:  no gross motor deficits        Psych:  Alert and Oriented x 3        Recent Lab Results:  LIPID RESULTS:  Lab Results   Component Value Date    CHOL 154 04/24/2019    HDL 60 04/24/2019    LDL 73 04/24/2019    TRIG 104 04/24/2019    CHOLHDLRATIO 3.8 06/30/2015       LIVER ENZYME RESULTS:  Lab Results   Component Value Date    AST 48 09/27/2011    ALT 88 (H) 09/27/2011       CBC RESULTS:  Lab " Results   Component Value Date    HGB 14.3 01/12/2010       BMP RESULTS:  Lab Results   Component Value Date     04/24/2019    POTASSIUM 4.5 04/24/2019    CHLORIDE 107 04/24/2019    CO2 24 04/24/2019    ANIONGAP 8 04/24/2019    GLC 95 04/24/2019    BUN 20 04/24/2019    CR 1.10 04/24/2019    GFRESTIMATED 78 04/24/2019    GFRESTBLACK 90 04/24/2019    VINCENT 9.3 04/24/2019        A1C RESULTS:  No results found for: A1C    INR RESULTS:  Lab Results   Component Value Date    INR 1.0 07/01/2012         Thank you for allowing me to participate in the care of your patient.    Sincerely,     DR OMEGA FUCHS MD     [facility]

## 2019-12-19 NOTE — PROGRESS NOTES
HPI and Plan:   It is a pleasure for me to see Mr. Beltre today who is here for cardiac evaluation.  He has an interesting cardiac history which started in 2012.  He recalls doing some yard work and barbecuing on a hot humid summer day.  He then suddenly experienced bilateral arm pain and chest pain.  He was taken to the emergency room at [facility].  His EKG was described as having ST segment depression.  He had a troponin peak of 48.27.  Echocardiography demonstrated low normal ejection fraction.  Coronary angiography showed only mild nonobstructive coronary artery disease.  He was given a presumptive diagnosis of infarction due to coronary artery spasm with a less likely diagnosis of acute myocarditis.  He has been fine since then with absolutely no recurrence of chest pain.  He feels well.  He has continued on aspirin, simvastatin and amlodipine and his medications are well-tolerated with no side effects.  His most recent lipid profile is A[place]ley excellent with an HDL of 60 LDL of 73 and triglycerides of 104.  Cardiac examination is unremarkable.  Today's EKG showed sinus rhythm with small Q waves seen over most of his leads.  I think this may be due to intraventricular conduction delay rather than being true Q waves.  He has nonspecific ST segment changes.    He was a smoker at the time of his cardiac event and he has since given up.  He denies any history of drug or alcohol abuse.  His family history was said to be positive but on further questioning his father had heart disease diagnosed in his 60s and is still doing well at the age of 80.  From my point of view his family history is negative.    I think it is very hard to say what caused this gentlemen's myocardial infarction or myocardial necrosis back in 2012.  Coronary artery spasm is a little less likely given the fact that he had ST segment depression rather than elevation on his EKG.  I still think acute myocarditis is a distinct  possibility.  In any case he is doing well right now and I do not think further cardiac evaluation would be needed.  As his medications are well-tolerated and I could not absolutely exclude the possibility of coronary artery spasm I have asked him to continue his medications.  Mild coronary artery disease would not be entirely unexpected in a gentleman who was 42 at the time and smoking cigarettes.  As such I think he could do well to continue with aspirin and simvastatin.  I have provisionally arranged to see him again in 2 years time for further follow-up.    Orders Placed This Encounter   Procedures     EKG 12-lead complete w/read - Clinics (performed today)       No orders of the defined types were placed in this encounter.      Encounter Diagnosis   Name Primary?     Coronary artery disease involving native coronary artery of native heart without angina pectoris Yes       CURRENT MEDICATIONS:  Current Outpatient Medications   Medication Sig Dispense Refill     amLODIPine (NORVASC) 2.5 MG tablet Take 1 tablet (2.5 mg) by mouth daily 90 tablet 1     aspirin 81 MG tablet Take 1 tablet by mouth daily. 90 tablet 3     atorvastatin (LIPITOR) 40 MG tablet TAKE 1 TABLET (40 MG) BY MOUTH DAILY 90 tablet 1     buPROPion (WELLBUTRIN XL) 150 MG 24 hr tablet Take one tab daily 90 tablet 1     cholecalciferol (VITAMIN D3) 1000 UNIT tablet Take 2 tablets (2,000 Units) by mouth daily 100 tablet 3     cyclobenzaprine (FLEXERIL) 10 MG tablet Take 0.5-1 tablets (5-10 mg) by mouth 3 times daily as needed for muscle spasms 30 tablet 1       ALLERGIES     Allergies   Allergen Reactions     Dust Mite Extract        PAST MEDICAL HISTORY:  Past Medical History:   Diagnosis Date     Active smoker 10/24/2011     Allergic rhinitis due to other allergen     resolved     Anxiety state, unspecified     citalopram, stopped in 3/07, restarted in 1/08     Hyperlipidemia LDL goal < 130 4/29/2008    simvastatin     Major depressive disorder,  recurrent, moderate (H)     citalopram helping, back on since        PAST SURGICAL HISTORY:  Past Surgical History:   Procedure Laterality Date     CARDIAC ECHO (METRO)      JOHN Peoples, borderline concentric LVH, EF 55%     NO HISTORY OF SURGERY         FAMILY HISTORY:  Family History   Problem Relation Age of Onset     Depression Mother      Gynecology Mother         Hysterectomy     Heart Disease Father         MI around age 60     Allergies Father      Lipids Father      Prostate Cancer Father      Genitourinary Problems Maternal Grandfather         Unsure of what     Respiratory Maternal Grandfather         Breathing problems from farming     Heart Disease Paternal Grandfather         MI around 65       SOCIAL HISTORY:  Social History     Socioeconomic History     Marital status:      Spouse name: None     Number of children: None     Years of education: None     Highest education level: None   Occupational History     Comment: [employer]     Employer: SELF   Social Needs     Financial resource strain: None     Food insecurity:     Worry: None     Inability: None     Transportation needs:     Medical: None     Non-medical: None   Tobacco Use     Smoking status: Former Smoker     Packs/day: 0.25     Years: 20.00     Pack years: 5.00     Types: Cigarettes     Last attempt to quit: 2012     Years since quittin.4     Smokeless tobacco: Never Used     Tobacco comment: stopped after chest pain ER visit in    Substance and Sexual Activity     Alcohol use: Yes     Drinks per session: 1 or 2     Binge frequency: Weekly     Comment: 1-2 drinks week     Drug use: No     Comment: Sober from marijuana since 05     Sexual activity: Yes     Partners: Female     Comment:  9/10, going well   Lifestyle     Physical activity:     Days per week: None     Minutes per session: None     Stress: None   Relationships     Social connections:     Talks on phone: None     Gets  together: None     Attends Latter day service: None     Active member of club or organization: None     Attends meetings of clubs or organizations: None     Relationship status: None     Intimate partner violence:     Fear of current or ex partner: None     Emotionally abused: None     Physically abused: None     Forced sexual activity: None   Other Topics Concern      Service No     Blood Transfusions No     Caffeine Concern No     Occupational Exposure No     Hobby Hazards No     Sleep Concern Yes     Stress Concern Yes     Weight Concern Yes     Special Diet No     Back Care No     Exercise No     Bike Helmet No     Seat Belt Yes     Self-Exams No   Social History Narrative     for past 1 year. No kids. 2 cats.            Social Documentation:        Balanced Diet: YES- Moderate    Calcium intake: 3-4 per day    Caffeine: none per day    Exercise:  type of activity walking ;  3-4 times per week    Sunscreen: Yes    Seatbelts:  Yes    Self Testicular Exam: Yes    Physical/Emotional/Sexual Abuse: No    Do you feel safe in your environment? Yes        Cholesterol screen up to date: Yes- will check today, fasting    Eye Exam up to date: Yes    Dental Exam up to date: Yes    Pap smear up to date: Does Not Apply    Mammogram up to date: Yes    Dexa Scan up to date: Does Not Apply    Colonoscopy up to date: Does Not Apply    Immunizations up to date: No TDAP ordered, pt is unsure    Glucose screen if over 40:  Yes 02/18/08        Trisha Gregory MA    02/18/08       Review of Systems:  Skin:  Negative     Eyes:  Positive for glasses  ENT:  Negative    Respiratory:  Negative    Cardiovascular:  Negative;palpitations;chest pain;dizziness;lightheadedness;syncope or near-syncope;cyanosis;fatigue;edema    Gastroenterology: Positive for heartburn  Genitourinary:  Negative    Musculoskeletal:  Positive for back pain  Neurologic:  Negative    Psychiatric:  Positive for depression  Heme/Lymph/Imm:  Positive for  "easy bruising  Endocrine:  Negative      Physical Exam:  Vitals: /74 (BP Location: Left arm, Cuff Size: Adult Large)   Pulse 74   Ht 1.867 m (6' 1.5\")   Wt 99.9 kg (220 lb 3.2 oz)   BMI 28.66 kg/m      Constitutional:  cooperative, alert and oriented, well developed, well nourished, in no acute distress        Skin:  warm and dry to the touch, no apparent skin lesions or masses noted          Head:  normocephalic, no masses or lesions        Eyes:  pupils equal and round, conjunctivae and lids unremarkable, sclera white, no xanthalasma, EOMS intact, no nystagmus        Lymph:No Cervical lymphadenopathy present     ENT:           Neck:  carotid pulses are full and equal bilaterally, JVP normal, no carotid bruit        Respiratory:  normal breath sounds, clear to auscultation, normal A-P diameter, normal symmetry, normal respiratory excursion, no use of accessory muscles         Cardiac: regular rhythm, normal S1/S2, no S3 or S4, apical impulse not displaced, no murmurs, gallops or rubs                pulses full and equal, no bruits auscultated                                        GI:  abdomen soft, non-tender, BS normoactive, no mass, no HSM, no bruits        Extremities and Muscular Skeletal:  no deformities, clubbing, cyanosis, erythema observed              Neurological:  no gross motor deficits        Psych:  Alert and Oriented x 3        Recent Lab Results:  LIPID RESULTS:  Lab Results   Component Value Date    CHOL 154 04/24/2019    HDL 60 04/24/2019    LDL 73 04/24/2019    TRIG 104 04/24/2019    CHOLHDLRATIO 3.8 06/30/2015       LIVER ENZYME RESULTS:  Lab Results   Component Value Date    AST 48 09/27/2011    ALT 88 (H) 09/27/2011       CBC RESULTS:  Lab Results   Component Value Date    HGB 14.3 01/12/2010       BMP RESULTS:  Lab Results   Component Value Date     04/24/2019    POTASSIUM 4.5 04/24/2019    CHLORIDE 107 04/24/2019    CO2 24 04/24/2019    ANIONGAP 8 04/24/2019    GLC 95 " 04/24/2019    BUN 20 04/24/2019    CR 1.10 04/24/2019    GFRESTIMATED 78 04/24/2019    GFRESTBLACK 90 04/24/2019    VINCENT 9.3 04/24/2019        A1C RESULTS:  No results found for: A1C    INR RESULTS:  Lab Results   Component Value Date    INR 1.0 07/01/2012           CC  Danika Hargrove MD  [address] [facility] [facility]  [address]  [place] [address]

## 2019-12-19 NOTE — LETTER
12/19/2019    Danika Hargrove MD  [address] [facility]  [address]  [facility] [address]    RE: Rob Beltre       Dear Colleague,    I had the pleasure of seeing Rob Beltre in the [facility].    HPI and Plan:   It is a pleasure for me to see Mr. Beltre today who is here for cardiac evaluation.  He has an interesting cardiac history which started in 2012.  He recalls doing some yard work and barbecuing on a hot humid summer day.  He then suddenly experienced bilateral arm pain and chest pain.  He was taken to the emergency room at [facility].  His EKG was described as having ST segment depression.  He had a troponin peak of 48.27.  Echocardiography demonstrated low normal ejection fraction.  Coronary angiography showed only mild nonobstructive coronary artery disease.  He was given a presumptive diagnosis of infarction due to coronary artery spasm with a less likely diagnosis of acute myocarditis.  He has been fine since then with absolutely no recurrence of chest pain.  He feels well.  He has continued on aspirin, simvastatin and amlodipine and his medications are well-tolerated with no side effects.  His most recent lipid profile is [place] excellent with an HDL of 60 LDL of 73 and triglycerides of 104.  Cardiac examination is unremarkable.  Today's EKG showed sinus rhythm with small Q waves seen over most of his leads.  I think this may be due to intraventricular conduction delay rather than being true Q waves.  He has nonspecific ST segment changes.    He was a smoker at the time of his cardiac event and he has since given up.  He denies any history of drug or alcohol abuse.  His family history was said to be positive but on further questioning his father had heart disease diagnosed in his 60s and is still doing well at the age of 80.  From my point of view his family history is negative.    I think it is very hard to say what caused this gentlemen's myocardial  infarction or myocardial necrosis back in 2012.  Coronary artery spasm is a little less likely given the fact that he had ST segment depression rather than elevation on his EKG.  I still think acute myocarditis is a distinct possibility.  In any case he is doing well right now and I do not think further cardiac evaluation would be needed.  As his medications are well-tolerated and I could not absolutely exclude the possibility of coronary artery spasm I have asked him to continue his medications.  Mild coronary artery disease would not be entirely unexpected in a gentleman who was 42 at the time and smoking cigarettes.  As such I think he could do well to continue with aspirin and simvastatin.  I have provisionally arranged to see him again in 2 years time for further follow-up.    Orders Placed This Encounter   Procedures     EKG 12-lead complete w/read - Clinics (performed today)       No orders of the defined types were placed in this encounter.      Encounter Diagnosis   Name Primary?     Coronary artery disease involving native coronary artery of native heart without angina pectoris Yes       CURRENT MEDICATIONS:  Current Outpatient Medications   Medication Sig Dispense Refill     amLODIPine (NORVASC) 2.5 MG tablet Take 1 tablet (2.5 mg) by mouth daily 90 tablet 1     aspirin 81 MG tablet Take 1 tablet by mouth daily. 90 tablet 3     atorvastatin (LIPITOR) 40 MG tablet TAKE 1 TABLET (40 MG) BY MOUTH DAILY 90 tablet 1     buPROPion (WELLBUTRIN XL) 150 MG 24 hr tablet Take one tab daily 90 tablet 1     cholecalciferol (VITAMIN D3) 1000 UNIT tablet Take 2 tablets (2,000 Units) by mouth daily 100 tablet 3     cyclobenzaprine (FLEXERIL) 10 MG tablet Take 0.5-1 tablets (5-10 mg) by mouth 3 times daily as needed for muscle spasms 30 tablet 1       ALLERGIES     Allergies   Allergen Reactions     Dust Mite Extract        PAST MEDICAL HISTORY:  Past Medical History:   Diagnosis Date     Active smoker 10/24/2011      Allergic rhinitis due to other allergen     resolved     Anxiety state, unspecified     citalopram, stopped in 3/07, restarted in      Hyperlipidemia LDL goal < 130 2008    simvastatin     Major depressive disorder, recurrent, moderate (H)     citalopram helping, back on since        PAST SURGICAL HISTORY:  Past Surgical History:   Procedure Laterality Date     CARDIAC ECHO ([facility]RO)      JOHN Peoples, borderline concentric LVH, EF 55%     NO HISTORY OF SURGERY         FAMILY HISTORY:  Family History   Problem Relation Age of Onset     Depression Mother      Gynecology Mother         Hysterectomy     Heart Disease Father         MI around age 60     Allergies Father      Lipids Father      Prostate Cancer Father      Genitourinary Problems Maternal Grandfather         Unsure of what     Respiratory Maternal Grandfather         Breathing problems from farming     Heart Disease Paternal Grandfather         MI around 65       SOCIAL HISTORY:  Social History     Socioeconomic History     Marital status:      Spouse name: None     Number of children: None     Years of education: None     Highest education level: None   Occupational History     Comment: [employer]     Employer: SELF   Social Needs     Financial resource strain: None     Food insecurity:     Worry: None     Inability: None     Transportation needs:     Medical: None     Non-medical: None   Tobacco Use     Smoking status: Former Smoker     Packs/day: 0.25     Years: 20.00     Pack years: 5.00     Types: Cigarettes     Last attempt to quit: 2012     Years since quittin.4     Smokeless tobacco: Never Used     Tobacco comment: stopped after chest pain ER visit in    Substance and Sexual Activity     Alcohol use: Yes     Drinks per session: 1 or 2     Binge frequency: Weekly     Comment: 1-2 drinks week     Drug use: No     Comment: Sober from marijuana since 05     Sexual activity: Yes     Partners: Female      Comment:  9/10, going well   Lifestyle     Physical activity:     Days per week: None     Minutes per session: None     Stress: None   Relationships     Social connections:     Talks on phone: None     Gets together: None     Attends Scientology service: None     Active member of club or organization: None     Attends meetings of clubs or organizations: None     Relationship status: None     Intimate partner violence:     Fear of current or ex partner: None     Emotionally abused: None     Physically abused: None     Forced sexual activity: None   Other Topics Concern      Service No     Blood Transfusions No     Caffeine Concern No     Occupational Exposure No     Hobby Hazards No     Sleep Concern Yes     Stress Concern Yes     Weight Concern Yes     Special Diet No     Back Care No     Exercise No     Bike Helmet No     Seat Belt Yes     Self-Exams No   Social History Narrative     for past 1 year. No kids. 2 cats.            Social Documentation:        Balanced Diet: YES- Moderate    Calcium intake: 3-4 per day    Caffeine: none per day    Exercise:  type of activity walking ;  3-4 times per week    Sunscreen: Yes    Seatbelts:  Yes    Self Testicular Exam: Yes    Physical/Emotional/Sexual Abuse: No    Do you feel safe in your environment? Yes        Cholesterol screen up to date: Yes- will check today, fasting    Eye Exam up to date: Yes    Dental Exam up to date: Yes    Pap smear up to date: Does Not Apply    Mammogram up to date: Yes    Dexa Scan up to date: Does Not Apply    Colonoscopy up to date: Does Not Apply    Immunizations up to date: No TDAP ordered, pt is unsure    Glucose screen if over 40:  Yes 02/18/08        Trisha Gregory MA    02/18/08       Review of Systems:  Skin:  Negative     Eyes:  Positive for glasses  ENT:  Negative    Respiratory:  Negative    Cardiovascular:  Negative;palpitations;chest pain;dizziness;lightheadedness;syncope or  "near-syncope;cyanosis;fatigue;edema    Gastroenterology: Positive for heartburn  Genitourinary:  Negative    Musculoskeletal:  Positive for back pain  Neurologic:  Negative    Psychiatric:  Positive for depression  Heme/Lymph/Imm:  Positive for easy bruising  Endocrine:  Negative      Physical Exam:  Vitals: /74 (BP Location: Left arm, Cuff Size: Adult Large)   Pulse 74   Ht 1.867 m (6' 1.5\")   Wt 99.9 kg (220 lb 3.2 oz)   BMI 28.66 kg/m       Constitutional:  cooperative, alert and oriented, well developed, well nourished, in no acute distress        Skin:  warm and dry to the touch, no apparent skin lesions or masses noted          Head:  normocephalic, no masses or lesions        Eyes:  pupils equal and round, conjunctivae and lids unremarkable, sclera white, no xanthalasma, EOMS intact, no nystagmus        Lymph:No Cervical lymphadenopathy present     ENT:           Neck:  carotid pulses are full and equal bilaterally, JVP normal, no carotid bruit        Respiratory:  normal breath sounds, clear to auscultation, normal A-P diameter, normal symmetry, normal respiratory excursion, no use of accessory muscles         Cardiac: regular rhythm, normal S1/S2, no S3 or S4, apical impulse not displaced, no murmurs, gallops or rubs                pulses full and equal, no bruits auscultated                                        GI:  abdomen soft, non-tender, BS normoactive, no mass, no HSM, no bruits        Extremities and Muscular Skeletal:  no deformities, clubbing, cyanosis, erythema observed              Neurological:  no gross motor deficits        Psych:  Alert and Oriented x 3        Recent Lab Results:  LIPID RESULTS:  Lab Results   Component Value Date    CHOL 154 04/24/2019    HDL 60 04/24/2019    LDL 73 04/24/2019    TRIG 104 04/24/2019    CHOLHDLRATIO 3.8 06/30/2015       LIVER ENZYME RESULTS:  Lab Results   Component Value Date    AST 48 09/27/2011    ALT 88 (H) 09/27/2011       CBC RESULTS:  Lab " Results   Component Value Date    HGB 14.3 01/12/2010       BMP RESULTS:  Lab Results   Component Value Date     04/24/2019    POTASSIUM 4.5 04/24/2019    CHLORIDE 107 04/24/2019    CO2 24 04/24/2019    ANIONGAP 8 04/24/2019    GLC 95 04/24/2019    BUN 20 04/24/2019    CR 1.10 04/24/2019    GFRESTIMATED 78 04/24/2019    GFRESTBLACK 90 04/24/2019    VINCENT 9.3 04/24/2019        A1C RESULTS:  No results found for: A1C    INR RESULTS:  Lab Results   Component Value Date    INR 1.0 07/01/2012           CC  Danika Hargrove MD  [address] [place] [address]                  Thank you for allowing me to participate in the care of your patient.      Sincerely,     DR OMEGA FUCHS MD     [facility]    cc:   Danika Hargrove MD  [address] [place] [address]

## 2020-02-16 ENCOUNTER — HEALTH MAINTENANCE LETTER (OUTPATIENT)
Age: 50
End: 2020-02-16

## 2020-05-15 DIAGNOSIS — F41.1 ANXIETY STATE: ICD-10-CM

## 2020-05-15 DIAGNOSIS — F33.1 MAJOR DEPRESSIVE DISORDER, RECURRENT, MODERATE (H): ICD-10-CM

## 2020-05-15 DIAGNOSIS — E78.5 HYPERLIPIDEMIA LDL GOAL <100: ICD-10-CM

## 2020-05-15 NOTE — TELEPHONE ENCOUNTER
"Requested Prescriptions   Pending Prescriptions Disp Refills     atorvastatin (LIPITOR) 40 MG tablet [Pharmacy Med Name: ATORVASTATIN 40 MG TABLET]  Last Written Prescription Date:  11/19/2019  Last Fill Quantity: 90 tablet,  # refills: 1   Last office visit: 11/19/2019 with prescribing provider:  Delvin   Future Office Visit:       90 tablet 1     Sig: TAKE 1 TABLET BY MOUTH EVERY DAY       Statins Protocol Failed - 5/15/2020  1:52 AM        Failed - LDL on file in past 12 months     Recent Labs   Lab Test 04/24/19  0854   LDL 73             Passed - No abnormal creatine kinase in past 12 months     No lab results found.             Passed - Recent (12 mo) or future (30 days) visit within the authorizing provider's specialty     Patient has had an office visit with the authorizing provider or a provider within the authorizing providers department within the previous 12 mos or has a future within next 30 days. See \"Patient Info\" tab in inbasket, or \"Choose Columns\" in Meds & Orders section of the refill encounter.              Passed - Medication is active on med list        Passed - Patient is age 18 or older           buPROPion (WELLBUTRIN XL) 150 MG 24 hr tablet [Pharmacy Med Name: BUPROPION HCL  MG TABLET]  Last Written Prescription Date: 11/19/2019  Last Fill Quantity: 90 tablet,  # refills: 1   Last office visit: 11/19/2019 with prescribing provider:  Delvin   Future Office Visit:     90 tablet 1     Sig: TAKE 1 TABLET BY MOUTH EVERY DAY       SSRIs Protocol Passed - 5/15/2020  1:52 AM        Passed - PHQ-9 score less than 5 in past 6 months     Please review last PHQ-9 score.   PHQ-9 SCORE 12/13/2017 9/5/2018 11/19/2019   PHQ-9 Total Score - - -   PHQ-9 Total Score 3 1 0     YOGESH-7 SCORE 12/13/2017 9/5/2018 11/19/2019   Total Score - - -   Total Score 3 1 0             Passed - Medication is Bupropion     If the medication is Bupropion (Wellbutrin), and the patient is taking for smoking cessation; OK to " "refill.          Passed - Medication is active on med list        Passed - Patient is age 18 or older        Passed - Recent (6 mo) or future (30 days) visit within the authorizing provider's specialty     Patient had office visit in the last 6 months or has a visit in the next 30 days with authorizing provider or within the authorizing provider's specialty.  See \"Patient Info\" tab in inbasket, or \"Choose Columns\" in Meds & Orders section of the refill encounter.                  "

## 2020-05-15 NOTE — TELEPHONE ENCOUNTER
Note from 11/19/19 OV:  Return in about 6 months (around 5/19/2020) for Physical Exam, Fasting labs prior.    [employer] message sent to patient.  Marion Collins RN

## 2020-05-22 RX ORDER — BUPROPION HYDROCHLORIDE 150 MG/1
TABLET ORAL
Qty: 30 TABLET | Refills: 0 | Status: SHIPPED | OUTPATIENT
Start: 2020-05-22 | End: 2020-05-29

## 2020-05-22 RX ORDER — ATORVASTATIN CALCIUM 40 MG/1
TABLET, FILM COATED ORAL
Qty: 30 TABLET | Refills: 0 | Status: SHIPPED | OUTPATIENT
Start: 2020-05-22 | End: 2020-05-29

## 2020-05-22 NOTE — TELEPHONE ENCOUNTER
Prescription approved per [facility] Refill Protocol.    Next 5 appointments (look out 90 days)    May 29, 2020 11:30 AM CDT  Telephone Visit with Danika Hargrove MD  [facility] ([facility]) [address] [facility] [id number]  [phone]        Joy CABALLERO RN

## 2020-05-29 ENCOUNTER — VIRTUAL VISIT (OUTPATIENT)
Dept: FAMILY MEDICINE | Facility: CLINIC | Age: 50
End: 2020-05-29
Payer: COMMERCIAL

## 2020-05-29 DIAGNOSIS — I25.10 CORONARY ARTERY DISEASE INVOLVING NATIVE CORONARY ARTERY OF NATIVE HEART WITHOUT ANGINA PECTORIS: ICD-10-CM

## 2020-05-29 DIAGNOSIS — F33.1 MAJOR DEPRESSIVE DISORDER, RECURRENT, MODERATE (H): ICD-10-CM

## 2020-05-29 DIAGNOSIS — F41.1 ANXIETY STATE: ICD-10-CM

## 2020-05-29 DIAGNOSIS — E78.5 HYPERLIPIDEMIA LDL GOAL <100: ICD-10-CM

## 2020-05-29 PROCEDURE — 99214 OFFICE O/P EST MOD 30 MIN: CPT | Mod: 95 | Performed by: FAMILY MEDICINE

## 2020-05-29 RX ORDER — ATORVASTATIN CALCIUM 40 MG/1
TABLET, FILM COATED ORAL
Qty: 90 TABLET | Refills: 1 | Status: SHIPPED | OUTPATIENT
Start: 2020-05-29 | End: 2020-11-17

## 2020-05-29 RX ORDER — AMLODIPINE BESYLATE 2.5 MG/1
2.5 TABLET ORAL DAILY
Qty: 90 TABLET | Refills: 1 | Status: SHIPPED | OUTPATIENT
Start: 2020-05-29 | End: 2020-11-17

## 2020-05-29 RX ORDER — BUPROPION HYDROCHLORIDE 150 MG/1
TABLET ORAL
Qty: 90 TABLET | Refills: 1 | Status: SHIPPED | OUTPATIENT
Start: 2020-05-29 | End: 2020-11-17

## 2020-05-29 ASSESSMENT — PATIENT HEALTH QUESTIONNAIRE - PHQ9: SUM OF ALL RESPONSES TO PHQ QUESTIONS 1-9: 1

## 2020-05-29 NOTE — PROGRESS NOTES
"Rob Beltre is a 49 year old male who is being evaluated via a billable telephone visit.      The patient has been notified of following:     \"This telephone visit will be conducted via a call between you and your physician/provider. We have found that certain health care needs can be provided without the need for a physical exam.  This service lets us provide the care you need with a short phone conversation.  If a prescription is necessary we can send it directly to your pharmacy.  If lab work is needed we can place an order for that and you can then stop by our lab to have the test done at a later time.    Telephone visits are billed at different rates depending on your insurance coverage. During this emergency period, for some insurers they may be billed the same as an in-person visit.  Please reach out to your insurance provider with any questions.    If during the course of the call the physician/provider feels a telephone visit is not appropriate, you will not be charged for this service.\"    Patient has given verbal consent for Telephone visit?  Yes    What phone number would you like to be contacted at? [phone]    How would you like to obtain your AVS? Andrew Kingston   Rob Beltre is a 49 year old male who presents via phone visit today for the following health issues:    HPI  Hypertension Follow-up    Do you check your blood pressure regularly outside of the clinic? No     Are you following a low salt diet? Yes    Are your blood pressures ever more than 140 on the top number (systolic) OR more   than 90 on the bottom number (diastolic), for example 140/90? No    How many servings of fruits and vegetables do you eat daily?  2-3    On average, how many sweetened beverages do you drink each day (Examples: soda, juice, sweet tea, etc.  Do NOT count diet or artificially sweetened beverages)?   0    How many days per week do you exercise enough to make your heart beat faster? 7    How many " minutes a day do you exercise enough to make your heart beat faster? 60 or more  How many days per week do you miss taking your medication? 2    What makes it hard for you to take your medications?  remembering to take    Reviewed cardiology consultation from -  Reassuring, but not 'cleared' of cardiac concerns or CAD, so he recommended patient stay on asa and the other medications.  Pt only frustrated that he can't get life insurance.  Since that visit, he started back on the low-dose aspirin.    He has always continued on the amlodipine and simvastatin.    Labs have been good and stable- last set done just over a year ago in , no issues.    Weight- Stable.  Currently at 212 lbs.  Having a little bit more comfort food, but his exercise has picked up with COVID issues.  Exercise- lots more walking.      Mood- pretty good now.    End of the winter, still dipped.  Harder when there are week or more of cloudiness.  Better now with more sun, and being able to get outside more.      Patient Active Problem List   Diagnosis     Allergic rhinitis due to other allergen     Anxiety state     Major depressive disorder, recurrent, moderate (H)     Hyperlipidemia LDL goal <100     GERD (gastroesophageal reflux disease)     CAD (coronary artery disease)     Nonspecific abnormal electrocardiogram (ECG) (EKG)     Past Surgical History:   Procedure Laterality Date     CARDIAC ECHO ([facility])      JOHN Peoples, borderline concentric LVH, EF 55%     NO HISTORY OF SURGERY         Social History     Tobacco Use     Smoking status: Former Smoker     Packs/day: 0.25     Years: 20.00     Pack years: 5.00     Types: Cigarettes     Last attempt to quit: 2012     Years since quittin.9     Smokeless tobacco: Never Used     Tobacco comment: stopped after chest pain ER visit in    Substance Use Topics     Alcohol use: Yes     Drinks per session: 1 or 2     Binge frequency: Weekly     Comment: 1-2 drinks week     Family  History   Problem Relation Age of Onset     Depression Mother      Gynecology Mother         Hysterectomy     Heart Disease Father         MI around age 60     Allergies Father      Lipids Father      Prostate Cancer Father      Genitourinary Problems Maternal Grandfather         Unsure of what     Respiratory Maternal Grandfather         Breathing problems from farming     Heart Disease Paternal Grandfather         MI around 65         Current Outpatient Medications   Medication Sig Dispense Refill     amLODIPine (NORVASC) 2.5 MG tablet Take 1 tablet (2.5 mg) by mouth daily 90 tablet 1     aspirin 81 MG tablet Take 1 tablet by mouth daily. 90 tablet 3     atorvastatin (LIPITOR) 40 MG tablet TAKE 1 TABLET BY MOUTH EVERY DAY 90 tablet 1     buPROPion (WELLBUTRIN XL) 150 MG 24 hr tablet TAKE 1 TABLET BY MOUTH EVERY DAY 90 tablet 1     cholecalciferol (VITAMIN D3) 1000 UNIT tablet Take 2 tablets (2,000 Units) by mouth daily 100 tablet 3     cyclobenzaprine (FLEXERIL) 10 MG tablet Take 0.5-1 tablets (5-10 mg) by mouth 3 times daily as needed for muscle spasms 30 tablet 1     Allergies   Allergen Reactions     Dust Mite Extract      Recent Labs   Lab Test 04/24/19  0854 12/13/17  0904 04/07/17  1548   LDL 73 73 61   HDL 60 55 54   TRIG 104 100 105   CR 1.10 1.04  --    GFRESTIMATED 78 76  --    GFRESTBLACK 90 >90  --    POTASSIUM 4.5 4.0  --       BP Readings from Last 3 Encounters:   12/19/19 124/74   11/19/19 113/72   04/24/19 104/67    Wt Readings from Last 3 Encounters:   12/19/19 99.9 kg (220 lb 3.2 oz)   11/19/19 98.7 kg (217 lb 11.2 oz)   04/24/19 96.6 kg (213 lb)                    Reviewed and updated as needed this visit by Provider         Review of Systems   Constitutional, HEENT, cardiovascular, pulmonary, gi and gu systems are negative, except as otherwise noted.       Objective   Reported vitals:  There were no vitals taken for this visit.   healthy, alert and no distress  PSYCH: Alert and oriented times  3; coherent speech, normal   rate and volume, able to articulate logical thoughts, able   to abstract reason, no tangential thoughts, no hallucinations   or delusions  His affect is normal  RESP: No cough, no audible wheezing, able to talk in full sentences  Remainder of exam unable to be completed due to telephone visits    Diagnostic Test Results:  Labs reviewed in Epic        Assessment/Plan:    ICD-10-CM    1. Coronary artery disease involving native coronary artery of native heart without angina pectoris  I25.10 amLODIPine (NORVASC) 2.5 MG tablet   2. Hyperlipidemia LDL goal <100  E78.5 atorvastatin (LIPITOR) 40 MG tablet   3. Anxiety state  F41.1 buPROPion (WELLBUTRIN XL) 150 MG 24 hr tablet   4. Major depressive disorder, recurrent, moderate (H)  F33.1 buPROPion (WELLBUTRIN XL) 150 MG 24 hr tablet        Doing well overall.  CAD prevention- still unsure dx of 2012 event when pt was smoking.  Cardiology unable to r/o CAD, so recommended staying on current meds, and pt restarted on asa.  He continues on the amlodipine and atorvastatin.  Will send in refills x 6 months as labs have been very stable- okay to push out.    MDD- doing well on wellbutrin XL 150mg/d.  Will cont.  PHQ 1 today.  Okay to lower Vit D to 1000 units/day if getting more sun this summer- increase back up to 2000 units/day in Fall.      Return in about 6 months (around 11/29/2020) for Physical Exam, Fasting labs at appointment, Blood Pressure Recheck, Depression follow-up.      Phone call duration:  13 minutes    Danika Hargrove MD

## 2020-11-06 DIAGNOSIS — I25.10 CORONARY ARTERY DISEASE INVOLVING NATIVE CORONARY ARTERY OF NATIVE HEART WITHOUT ANGINA PECTORIS: ICD-10-CM

## 2020-11-06 LAB
ANION GAP SERPL CALCULATED.3IONS-SCNC: 6 MMOL/L (ref 3–14)
BUN SERPL-MCNC: 18 MG/DL (ref 7–30)
CALCIUM SERPL-MCNC: 9.6 MG/DL (ref 8.5–10.1)
CHLORIDE SERPL-SCNC: 108 MMOL/L (ref 94–109)
CO2 SERPL-SCNC: 26 MMOL/L (ref 20–32)
CREAT SERPL-MCNC: 1.06 MG/DL (ref 0.66–1.25)
CREAT UR-MCNC: 167 MG/DL
GFR SERPL CREATININE-BSD FRML MDRD: 80 ML/MIN/{1.73_M2}
GLUCOSE SERPL-MCNC: 101 MG/DL (ref 70–99)
MICROALBUMIN UR-MCNC: 7 MG/L
MICROALBUMIN/CREAT UR: 4.12 MG/G CR (ref 0–17)
POTASSIUM SERPL-SCNC: 3.8 MMOL/L (ref 3.4–5.3)
SODIUM SERPL-SCNC: 140 MMOL/L (ref 133–144)

## 2020-11-06 PROCEDURE — 80048 BASIC METABOLIC PNL TOTAL CA: CPT | Performed by: FAMILY MEDICINE

## 2020-11-06 PROCEDURE — 82043 UR ALBUMIN QUANTITATIVE: CPT | Performed by: FAMILY MEDICINE

## 2020-11-06 PROCEDURE — 36415 COLL VENOUS BLD VENIPUNCTURE: CPT | Performed by: FAMILY MEDICINE

## 2020-11-12 NOTE — RESULT ENCOUNTER NOTE
Here are your lab results from your recent visit...  -Your basic metabolic panel (which includes electrolyte levels, blood sugar level and kidney function tests) looks good other than the slightly elevated glucose.  -Your urine microalbumin level (which is the urine test that can signal signs of early chronic kidney disease if elevated to >30) looks low which is good.    Unfortunately, it doesn't look like they ran your cholesterol panel (even though there are lab orders in to do it).  If you can, I would come to your upcoming appointment fasting so we can check it then.      I'm very sorry about the lab issue.  Please let me know if you have any questions in the meantime.    Best,   Rai Hargrove MD

## 2020-11-17 ENCOUNTER — OFFICE VISIT (OUTPATIENT)
Dept: FAMILY MEDICINE | Facility: CLINIC | Age: 50
End: 2020-11-17
Payer: COMMERCIAL

## 2020-11-17 VITALS
DIASTOLIC BLOOD PRESSURE: 75 MMHG | BODY MASS INDEX: 28.26 KG/M2 | OXYGEN SATURATION: 96 % | HEIGHT: 74 IN | WEIGHT: 220.2 LBS | HEART RATE: 66 BPM | SYSTOLIC BLOOD PRESSURE: 114 MMHG

## 2020-11-17 DIAGNOSIS — E78.5 HYPERLIPIDEMIA LDL GOAL <100: ICD-10-CM

## 2020-11-17 DIAGNOSIS — I25.10 CORONARY ARTERY DISEASE INVOLVING NATIVE CORONARY ARTERY OF NATIVE HEART WITHOUT ANGINA PECTORIS: ICD-10-CM

## 2020-11-17 DIAGNOSIS — Z12.11 COLON CANCER SCREENING: ICD-10-CM

## 2020-11-17 DIAGNOSIS — R06.83 SNORING: ICD-10-CM

## 2020-11-17 DIAGNOSIS — F33.1 MAJOR DEPRESSIVE DISORDER, RECURRENT, MODERATE (H): ICD-10-CM

## 2020-11-17 DIAGNOSIS — F41.1 ANXIETY STATE: ICD-10-CM

## 2020-11-17 DIAGNOSIS — Z00.00 ROUTINE GENERAL MEDICAL EXAMINATION AT A HEALTH CARE FACILITY: Primary | ICD-10-CM

## 2020-11-17 DIAGNOSIS — Z12.5 SCREENING FOR PROSTATE CANCER: ICD-10-CM

## 2020-11-17 LAB
CHOLEST SERPL-MCNC: 189 MG/DL
HCV AB SERPL QL IA: NONREACTIVE
HDLC SERPL-MCNC: 60 MG/DL
HIV 1+2 AB+HIV1 P24 AG SERPL QL IA: NONREACTIVE
LDLC SERPL CALC-MCNC: 101 MG/DL
NONHDLC SERPL-MCNC: 129 MG/DL
PSA SERPL-ACNC: 0.97 UG/L (ref 0–4)
TRIGL SERPL-MCNC: 138 MG/DL

## 2020-11-17 PROCEDURE — G0103 PSA SCREENING: HCPCS | Performed by: FAMILY MEDICINE

## 2020-11-17 PROCEDURE — 80061 LIPID PANEL: CPT | Performed by: FAMILY MEDICINE

## 2020-11-17 PROCEDURE — 36415 COLL VENOUS BLD VENIPUNCTURE: CPT | Performed by: FAMILY MEDICINE

## 2020-11-17 PROCEDURE — 87389 HIV-1 AG W/HIV-1&-2 AB AG IA: CPT | Performed by: FAMILY MEDICINE

## 2020-11-17 PROCEDURE — 99396 PREV VISIT EST AGE 40-64: CPT | Performed by: FAMILY MEDICINE

## 2020-11-17 PROCEDURE — 86803 HEPATITIS C AB TEST: CPT | Performed by: FAMILY MEDICINE

## 2020-11-17 RX ORDER — AMLODIPINE BESYLATE 2.5 MG/1
2.5 TABLET ORAL DAILY
Qty: 90 TABLET | Refills: 1 | Status: SHIPPED | OUTPATIENT
Start: 2020-11-17 | End: 2021-06-14

## 2020-11-17 RX ORDER — ATORVASTATIN CALCIUM 40 MG/1
TABLET, FILM COATED ORAL
Qty: 90 TABLET | Refills: 1 | Status: SHIPPED | OUTPATIENT
Start: 2020-11-17 | End: 2021-06-04

## 2020-11-17 RX ORDER — BUPROPION HYDROCHLORIDE 150 MG/1
TABLET ORAL
Qty: 90 TABLET | Refills: 1 | Status: SHIPPED | OUTPATIENT
Start: 2020-11-17 | End: 2021-07-09

## 2020-11-17 ASSESSMENT — ANXIETY QUESTIONNAIRES
2. NOT BEING ABLE TO STOP OR CONTROL WORRYING: NOT AT ALL
GAD7 TOTAL SCORE: 0
6. BECOMING EASILY ANNOYED OR IRRITABLE: NOT AT ALL
5. BEING SO RESTLESS THAT IT IS HARD TO SIT STILL: NOT AT ALL
7. FEELING AFRAID AS IF SOMETHING AWFUL MIGHT HAPPEN: NOT AT ALL
GAD7 TOTAL SCORE: 0
7. FEELING AFRAID AS IF SOMETHING AWFUL MIGHT HAPPEN: NOT AT ALL
3. WORRYING TOO MUCH ABOUT DIFFERENT THINGS: NOT AT ALL
1. FEELING NERVOUS, ANXIOUS, OR ON EDGE: NOT AT ALL
4. TROUBLE RELAXING: NOT AT ALL
GAD7 TOTAL SCORE: 0

## 2020-11-17 ASSESSMENT — MIFFLIN-ST. JEOR: SCORE: 1910.63

## 2020-11-17 ASSESSMENT — PATIENT HEALTH QUESTIONNAIRE - PHQ9
SUM OF ALL RESPONSES TO PHQ QUESTIONS 1-9: 0
SUM OF ALL RESPONSES TO PHQ QUESTIONS 1-9: 0
10. IF YOU CHECKED OFF ANY PROBLEMS, HOW DIFFICULT HAVE THESE PROBLEMS MADE IT FOR YOU TO DO YOUR WORK, TAKE CARE OF THINGS AT HOME, OR GET ALONG WITH OTHER PEOPLE: NOT DIFFICULT AT ALL

## 2020-11-17 NOTE — PROGRESS NOTES
SUBJECTIVE:   CC: Rob Beltre is an 50 year old male who presents for preventative health visit.     Patient has been advised of split billing requirements and indicates understanding: Yes       Healthy Habits:     Getting at least 3 servings of Calcium per day:  Yes    Bi-annual eye exam:  Yes    Dental care twice a year:  Yes    Sleep apnea or symptoms of sleep apnea:  None    Diet:  Low salt and Low fat/cholesterol    Frequency of exercise:  2-3 days/week    Duration of exercise:  Less than 15 minutes    Taking medications regularly:  Yes    Medication side effects:  None    PHQ-2 Total Score: 0    Additional concerns today:  No    Labs- normal BMP, microalb.  Glucose just a bit high at 101.    Lipids and PSA check today- is fasting.    MDD/Anxiety- bit more emotional, can tear up and cry.  Can be embarrassing, but not a deal breaker.    Had some weight gain on citalopram, but it was a little better than the wellbutrin, but really only because of the tearing up- otherwise feels similar.    Tried off in ~05.    Weight did go down after stopping citalopram- from ~245 to olow of 210, now stable around 220 lbs for awhile.    Eating take out more than before- trying to support local restaurants.  Then it became easier.    Exercise- summer is better.  Winter- likes to ski - downhill.  Will try and do a little more of that.  Big walkers.  Bought boots last year.    Reviewed CAD problem list notes-  Never got a sleep study.  Still snoring, but thinks it's bit better.      Today's PHQ-2 Score:   PHQ-2 ( 1999 [employer]) 11/17/2020   Q1: Little interest or pleasure in doing things 0   Q2: Feeling down, depressed or hopeless 0   PHQ-2 Score 0   Q1: Little interest or pleasure in doing things Not at all   Q2: Feeling down, depressed or hopeless Not at all   PHQ-2 Score 0       Abuse: Current or Past(Physical, Sexual or Emotional)- No  Do you feel safe in your environment? Yes        Social History     Tobacco Use     Smoking  status: Former Smoker     Packs/day: 0.25     Years: 20.00     Pack years: 5.00     Types: Cigarettes     Quit date: 2012     Years since quittin.3     Smokeless tobacco: Never Used     Tobacco comment: stopped after chest pain ER visit in    Substance Use Topics     Alcohol use: Yes     Drinks per session: 1 or 2     Binge frequency: Weekly     Comment: 1-2 drinks week       Alcohol Use 2020   Prescreen: >3 drinks/day or >7 drinks/week? No   Prescreen: >3 drinks/day or >7 drinks/week? -       Last PSA: No results found for: PSA    Reviewed orders with patient. Reviewed health maintenance and updated orders accordingly - Yes      Lab work is in process  Labs reviewed in EPIC  BP Readings from Last 3 Encounters:   20 114/75   19 124/74   19 113/72    Wt Readings from Last 3 Encounters:   20 99.9 kg (220 lb 3.2 oz)   19 99.9 kg (220 lb 3.2 oz)   19 98.7 kg (217 lb 11.2 oz)                  Patient Active Problem List   Diagnosis     Allergic rhinitis due to other allergen     Anxiety state     Major depressive disorder, recurrent, moderate (H)     Hyperlipidemia LDL goal <100     GERD (gastroesophageal reflux disease)     CAD (coronary artery disease)     Nonspecific abnormal electrocardiogram (ECG) (EKG)     Snoring     Past Surgical History:   Procedure Laterality Date     CARDIAC ECHO ([facility])      JOHN Peoples, borderline concentric LVH, EF 55%     NO HISTORY OF SURGERY         Social History     Tobacco Use     Smoking status: Former Smoker     Packs/day: 0.25     Years: 20.00     Pack years: 5.00     Types: Cigarettes     Quit date: 2012     Years since quittin.3     Smokeless tobacco: Never Used     Tobacco comment: stopped after chest pain ER visit in    Substance Use Topics     Alcohol use: Yes     Drinks per session: 1 or 2     Binge frequency: Weekly     Comment: 1-2 drinks week     Family History   Problem Relation Age of Onset      Depression Mother      Gynecology Mother         Hysterectomy     Heart Disease Father         MI around age 60     Allergies Father      Lipids Father      Prostate Cancer Father      Genitourinary Problems Maternal Grandfather         Unsure of what     Respiratory Maternal Grandfather         Breathing problems from farming     Heart Disease Paternal Grandfather         MI around 65         Current Outpatient Medications   Medication Sig Dispense Refill     amLODIPine (NORVASC) 2.5 MG tablet Take 1 tablet (2.5 mg) by mouth daily 90 tablet 1     aspirin 81 MG tablet Take 1 tablet by mouth daily. 90 tablet 3     atorvastatin (LIPITOR) 40 MG tablet TAKE 1 TABLET BY MOUTH EVERY DAY 90 tablet 1     buPROPion (WELLBUTRIN XL) 150 MG 24 hr tablet TAKE 1 TABLET BY MOUTH EVERY DAY 90 tablet 1     cholecalciferol (VITAMIN D3) 1000 UNIT tablet Take 2 tablets (2,000 Units) by mouth daily 100 tablet 3     cyclobenzaprine (FLEXERIL) 10 MG tablet Take 0.5-1 tablets (5-10 mg) by mouth 3 times daily as needed for muscle spasms 30 tablet 1     Allergies   Allergen Reactions     Dust Mite Extract      Recent Labs   Lab Test 11/06/20  1015 04/24/19  0854 12/13/17  0904 04/07/17  1548   LDL  --  73 73 61   HDL  --  60 55 54   TRIG  --  104 100 105   CR 1.06 1.10 1.04  --    GFRESTIMATED 80 78 76  --    GFRESTBLACK >90 90 >90  --    POTASSIUM 3.8 4.5 4.0  --         Reviewed and updated as needed this visit by clinical staff  Tobacco  Allergies  Meds  Problems  Med Hx  Surg Hx  Fam Hx          Reviewed and updated as needed this visit by Provider  Tobacco  Allergies  Meds  Problems  Med Hx  Surg Hx  Fam Hx             Review of Systems  10 point ROS of systems including Constitutional, Eyes, Respiratory, Cardiovascular, Gastroenterology, Genitourinary, Integumentary, Muscularskeletal, Psychiatric were all negative except for pertinent positives noted in my HPI.      OBJECTIVE:   /75   Pulse 66   Ht 1.867 m (6'  "1.5\")   Wt 99.9 kg (220 lb 3.2 oz)   SpO2 96%   BMI 28.66 kg/m      Physical Exam  GENERAL: healthy, alert and no distress  EYES: Eyes grossly normal to inspection, PERRL and conjunctivae and sclerae normal  HENT: ear canals and TM's normal, nose and mouth without ulcers or lesions  NECK: no adenopathy, no asymmetry, masses, or scars and thyroid normal to palpation  RESP: lungs clear to auscultation - no rales, rhonchi or wheezes  CV: regular rate and rhythm, normal S1 S2, no S3 or S4, no murmur, click or rub, no peripheral edema and peripheral pulses strong  ABDOMEN: soft, nontender, no hepatosplenomegaly, no masses and bowel sounds normal  MS: no gross musculoskeletal defects noted, no edema  SKIN: no suspicious lesions or rashes  NEURO: Normal strength and tone, mentation intact and speech normal  PSYCH: mentation appears normal, affect normal/bright    Diagnostic Test Results:  Labs reviewed in Epic  No results found for any visits on 11/17/20.    ASSESSMENT/PLAN:       ICD-10-CM    1. Routine general medical examination at a health care facility  Z00.00 HIV Antigen Antibody Combo     **Hepatitis C Screen Reflex to RNA FUTURE anytime   2. Major depressive disorder, recurrent, moderate (H)  F33.1 buPROPion (WELLBUTRIN XL) 150 MG 24 hr tablet   3. Anxiety state  F41.1 buPROPion (WELLBUTRIN XL) 150 MG 24 hr tablet   4. Coronary artery disease involving native coronary artery of native heart without angina pectoris  I25.10 amLODIPine (NORVASC) 2.5 MG tablet   5. Screening for prostate cancer  Z12.5 PSA, screen   6. Hyperlipidemia LDL goal <100  E78.5 Lipid panel reflex to direct LDL Fasting     atorvastatin (LIPITOR) 40 MG tablet   7. Colon cancer screening  Z12.11 Fecal colorectal cancer screen (FIT)   8. Snoring  R06.83      CPE- Discussed diet, calcium and exercise.  Eye and dental care UTD or recommended f/u.  No immunizations needed today.  See orders below for tests ordered and screening needed.  Discussed " "PSA screening pros/cons/recs.  Will send pt home with another FIT test- due- discussed he should bring to post-office in next few weeks.    MDD/Anxiety- good control of sx's (except tearfulness with movies/commercials which is slightly bothersome, but no wt gain like on the citalopram).  Would like to stay on it.  Would consider trial off at some point (tried in '05, but went back on in fall/winter w/ worsening sx's).    CAD/Lipids/snoring- fasting today as lipids weren't drawn at his recent lab appt.  He is fasting today- will check (other labs can be drawn as well).  Tolerating lipitor, norvasc and asa well, no se's.  No CAD sx's.  Reviewed cardiology recs- pt never got a sleep study.  Thinks he still snore, but less since wt loss.  Discussed importance, cardiac and other implications if KATHERINE not treated.  Pt will have his wife check to monitor snoring, and if any pauses/gasps, and will call for referral if concerns.      Patient has been advised of split billing requirements and indicates understanding: n/a  COUNSELING:   Reviewed preventive health counseling, as reflected in patient instructions    Estimated body mass index is 28.66 kg/m  as calculated from the following:    Height as of this encounter: 1.867 m (6' 1.5\").    Weight as of this encounter: 99.9 kg (220 lb 3.2 oz).     Weight management plan: Discussed healthy diet and exercise guidelines    He reports that he quit smoking about 8 years ago. His smoking use included cigarettes. He has a 5.00 pack-year smoking history. He has never used smokeless tobacco.      Counseling Resources:  ATP IV Guidelines  Pooled Cohorts Equation Calculator  FRAX Risk Assessment  ICSI Preventive Guidelines  Dietary Guidelines for Americans, 2010  USDA's MyPlate  ASA Prophylaxis  Lung CA Screening    Danika Hargrove MD  [facility] [place]  Answers for HPI/ROS submitted by the patient on 11/17/2020   If you checked off any problems, how difficult have " these problems made it for you to do your work, take care of things at home, or get along with other people?: Not difficult at all  PHQ9 TOTAL SCORE: 0  YOGESH 7 TOTAL SCORE: 0

## 2020-11-18 ASSESSMENT — PATIENT HEALTH QUESTIONNAIRE - PHQ9: SUM OF ALL RESPONSES TO PHQ QUESTIONS 1-9: 0

## 2020-11-18 ASSESSMENT — ANXIETY QUESTIONNAIRES: GAD7 TOTAL SCORE: 0

## 2020-11-24 NOTE — RESULT ENCOUNTER NOTE
Here are your lab results from your recent visit...  -Your HIV and hepatitis C screening labs are both negative.  -Your PSA (prostate cancer screening test) is very low which is good to see.  -Your cholesterol panel looks very good with a low LDL (the bad cholesterol) and a high HDL (the good cholesterol).     Please let me know if you have any questions.  Best,   Rai Hargrove MD

## 2021-08-02 DIAGNOSIS — I25.10 CORONARY ARTERY DISEASE INVOLVING NATIVE CORONARY ARTERY OF NATIVE HEART WITHOUT ANGINA PECTORIS: ICD-10-CM

## 2021-08-02 DIAGNOSIS — F33.1 MAJOR DEPRESSIVE DISORDER, RECURRENT, MODERATE (H): ICD-10-CM

## 2021-08-02 DIAGNOSIS — F41.1 ANXIETY STATE: ICD-10-CM

## 2021-08-03 ENCOUNTER — MYC MEDICAL ADVICE (OUTPATIENT)
Dept: FAMILY MEDICINE | Facility: CLINIC | Age: 51
End: 2021-08-03

## 2021-08-03 NOTE — TELEPHONE ENCOUNTER
Amlodipine, Bupropion:    One month supply of each sent 7/6/21.  Due for 6 month follow up.  [facility]ychart message sent last refills  Patient read message, no appointment made    [facility]art message sent to patient again. VM left asking patient to call clinic.  Marion Collins RN

## 2021-08-05 RX ORDER — BUPROPION HYDROCHLORIDE 150 MG/1
TABLET ORAL
Qty: 29 TABLET | Refills: 0 | Status: SHIPPED | OUTPATIENT
Start: 2021-08-05 | End: 2021-09-07

## 2021-08-05 RX ORDER — AMLODIPINE BESYLATE 2.5 MG/1
TABLET ORAL
Qty: 20 TABLET | Refills: 0 | Status: SHIPPED | OUTPATIENT
Start: 2021-08-05 | End: 2021-09-10

## 2021-08-05 NOTE — TELEPHONE ENCOUNTER
CW,  Left VM for patient  See below messages  No response from patient to our outreach  Please advise on further refill  Thanks,  Joy CABALLERO RN

## 2021-08-05 NOTE — TELEPHONE ENCOUNTER
#20 of both meds sent to pharmacy with msg- he can have further refills if he makes f/u appt.  THanks!  CW

## 2021-08-05 NOTE — TELEPHONE ENCOUNTER
Patient has appt scheduled for 9-10. Can he get a refill on his Bupropion and Amlodipine before his appt?   [phone]   Problem: Patient Care Overview  Goal: Plan of Care Review  Outcome: Ongoing (interventions implemented as appropriate)   05/13/19 1528   OTHER   Outcome Summary 23Hrs OBS; no c/o of pain this shift; NPO after Midnite r/t Stress Test; Pending ECHO results; monitor Bp r/t HTN,HTN meds on board       Problem: Cardiac: ACS (Acute Coronary Syndrome) (Adult)  Goal: Signs and Symptoms of Listed Potential Problems Will be Absent, Minimized or Managed (Cardiac: ACS)  Outcome: Ongoing (interventions implemented as appropriate)      Problem: Fall Risk (Adult)  Goal: Identify Related Risk Factors and Signs and Symptoms  Outcome: Ongoing (interventions implemented as appropriate)

## 2021-09-09 NOTE — PROGRESS NOTES
Assessment & Plan       ICD-10-CM    1. Coronary artery disease involving native coronary artery of native heart without angina pectoris  I25.10 Lipid panel reflex to direct LDL Fasting     Basic metabolic panel  (Ca, Cl, CO2, Creat, Gluc, K, Na, BUN)     Albumin Random Urine Quantitative with Creat Ratio     amLODIPine (NORVASC) 2.5 MG tablet   2. Hyperlipidemia LDL goal <100  E78.5 Lipid panel reflex to direct LDL Fasting   3. Gastroesophageal reflux disease, unspecified whether esophagitis present  K21.9    4. Major depressive disorder, recurrent, moderate (H)  F33.1 EMOTIONAL / BEHAVIORAL ASSESSMENT     buPROPion (WELLBUTRIN XL) 150 MG 24 hr tablet   5. Anxiety state  F41.1 EMOTIONAL / BEHAVIORAL ASSESSMENT     buPROPion (WELLBUTRIN XL) 150 MG 24 hr tablet   6. Colon cancer screening  Z12.11 Fecal colorectal cancer screen (FIT)      CAD/Lipids-   Pt noticed his weight was trending up again, so started running and cut back on carbs ~3 months ago, and weight has been back down.  Loves/hates running, but feels good doing it.  Will see if he can add some strengthening, or something to do when it's hard to run outside in the winter (discussed 7-min work out apps, [employer] girish, etc).  Will rtc to check fasting lipids, BMP.    GERD- pt notes he's been on daily PPI for a few yrs- went on, and just figured he should stay on.  Discussed risks, unknowns of long-term use.  Likelihood of rebound sx's if he stops.  Rec trying to wean down slowly with pepcid to help.  Pt will try to wean down and see how it goes.    Anxiety- sx's well controlled with wellbutrin XL, and hasn't caused wt gain for him like the citalopram.  Able to lose weight with diet/exercise as above the last few months.  Has tried off the citalopram several yrs ago, and needed to go back on in the fall/winter.      Colon cancer screening- discussed optiosn, he'll likely do the FIT test, but will call insurance about coverage of colonoscopy if positive  "prior.      38 minutes spent on the date of the encounter doing chart review, review of test results, interpretation of tests, patient visit and documentation        BMI:   Estimated body mass index is 27.03 kg/m  as calculated from the following:    Height as of this encounter: 1.892 m (6' 2.49\").    Weight as of this encounter: 96.8 kg (213 lb 4.8 oz).   Weight management plan: Discussed healthy diet and exercise guidelines      Return in about 6 months (around 3/10/2022) for Physical Exam, Blood Pressure Recheck, Anxiety follow-up (fasting labs soon).    Danika Hargrove MD  [facility]ELIS[facility] Cartagena is a 50 year old who presents for the following health issues- CAD/lipids, GERD, MDD/Anxiety    HPI     Hyperlipidemia Follow-Up    Are you regularly taking any medication or supplement to lower your cholesterol?   Yes- norvasc    Are you having muscle aches or other side effects that you think could be caused by your cholesterol lowering medication?  No    CAD/HTN/Lipids-  BP looks great with amlodipine 2.5mg/d.  Not fasting, so will rtc for lipids and other labs.    Weight was up at 225 lbs- now at 213 lbs - with improved exercise and diet!  Started running ~3 months ago, and started lower carb diet again.  3.5 mile course- runs 2/3 of the way.  Feels okay.  Knee/calves start hurting around that time.  Does feel great afterwards.  Breath is better- stairs are better.  Strengthening- knows it would be good for him, but feeling good about his current exercise now.    Diet- lower carb, maybe carb-heavy meal once a day instead of more.      Depression and Anxiety Follow-Up    How are you doing with your depression since your last visit? No change    How are you doing with your anxiety since your last visit?  No change    Are you having other symptoms that might be associated with depression or anxiety? No    Have you had a significant life event? No     Do you have any concerns " "with your use of alcohol or other drugs? No    Went on meds in .  At one point, he did try off the citalopram for 1-3 months, and sx's did return so he went back on it in fall/winter.  Switched to wellbutrin in .    GERD- went on it a few yrs ago, and just didn't stop.  Mother has gerd and is on daily meds.  Thought he would need that too.    - will try wean with pepcid to see if able to get off.  Likely started on it when weight was higher.        Social History     Tobacco Use     Smoking status: Former Smoker     Packs/day: 0.25     Years: 20.00     Pack years: 5.00     Types: Cigarettes     Quit date: 2012     Years since quittin.2     Smokeless tobacco: Never Used     Tobacco comment: stopped after chest pain ER visit in    Substance Use Topics     Alcohol use: Yes     Comment: 1-2 drinks week     Drug use: No     Comment: Sober from marijuana since 05     PHQ 2020 9/10/2021 9/10/2021   PHQ-9 Total Score 0 0 0   Q9: Thoughts of better off dead/self-harm past 2 weeks Not at all Not at all Not at all     YOGESH-7 SCORE 2019 2020 9/10/2021   Total Score - - -   Total Score - 0 (minimal anxiety) 0 (minimal anxiety)   Total Score 0 0 0         How many servings of fruits and vegetables do you eat daily?  2-3    On average, how many sweetened beverages do you drink each day (Examples: soda, juice, sweet tea, etc.  Do NOT count diet or artificially sweetened beverages)?   0    How many days per week do you exercise enough to make your heart beat faster? 4    How many minutes a day do you exercise enough to make your heart beat faster? 30 - 60    How many days per week do you miss taking your medication? 0      Review of Systems   Constitutional, HEENT, cardiovascular, pulmonary, gi and gu systems are negative, except as otherwise noted.      Objective    /73   Pulse 88   Temp 98  F (36.7  C) (Oral)   Ht 1.892 m (6' 2.49\")   Wt 96.8 kg (213 lb 4.8 oz)   BMI 27.03 " kg/m    Body mass index is 27.03 kg/m .  Physical Exam   GENERAL: healthy, alert and no distress  EYES: Eyes grossly normal to inspection, PERRL and conjunctivae and sclerae normal  HENT: ear canals and TM's normal  NECK: no adenopathy, no asymmetry, masses, or scars and thyroid normal to palpation  RESP: lungs clear to auscultation - no rales, rhonchi or wheezes  CV: regular rate and rhythm, normal S1 S2, no S3 or S4, no murmur, click or rub, no peripheral edema and peripheral pulses strong  ABDOMEN: soft, nontender, no hepatosplenomegaly, no masses and bowel sounds normal  MS: no gross musculoskeletal defects noted, no edema  SKIN: no suspicious lesions or rashes  NEURO: Normal strength and tone, mentation intact and speech normal  PSYCH: mentation appears normal, affect normal/bright    Office Visit on 11/17/2020   Component Date Value Ref Range Status     Cholesterol 11/17/2020 189  <200 mg/dL Final     Triglycerides 11/17/2020 138  <150 mg/dL Final    Fasting specimen     HDL Cholesterol 11/17/2020 60  >39 mg/dL Final     LDL Cholesterol Calculated 11/17/2020 101* <100 mg/dL Final    Comment: Above desirable:  100-129 mg/dl  Borderline High:  130-159 mg/dL  High:             160-189 mg/dL  Very high:       >189 mg/dl       Non HDL Cholesterol 11/17/2020 129  <130 mg/dL Final     PSA 11/17/2020 0.97  0 - 4 ug/L Final    Assay Method:  Chemiluminescence using Siemens Vista analyzer     HIV Antigen Antibody Combo 11/17/2020 Nonreactive  NR^Nonreactive     Final    HIV-1 p24 Ag & HIV-1/HIV-2 Ab Not Detected     Hepatitis C Antibody 11/17/2020 Nonreactive  NR^Nonreactive Final    Comment: Assay performance characteristics have not been established for newborns,   infants, and children

## 2021-09-10 ENCOUNTER — OFFICE VISIT (OUTPATIENT)
Dept: FAMILY MEDICINE | Facility: CLINIC | Age: 51
End: 2021-09-10
Payer: COMMERCIAL

## 2021-09-10 VITALS
HEIGHT: 74 IN | SYSTOLIC BLOOD PRESSURE: 117 MMHG | TEMPERATURE: 98 F | BODY MASS INDEX: 27.37 KG/M2 | HEART RATE: 88 BPM | DIASTOLIC BLOOD PRESSURE: 73 MMHG | WEIGHT: 213.3 LBS

## 2021-09-10 DIAGNOSIS — F41.1 ANXIETY STATE: ICD-10-CM

## 2021-09-10 DIAGNOSIS — F33.1 MAJOR DEPRESSIVE DISORDER, RECURRENT, MODERATE (H): ICD-10-CM

## 2021-09-10 DIAGNOSIS — E78.5 HYPERLIPIDEMIA LDL GOAL <100: ICD-10-CM

## 2021-09-10 DIAGNOSIS — Z12.11 COLON CANCER SCREENING: ICD-10-CM

## 2021-09-10 DIAGNOSIS — I25.10 CORONARY ARTERY DISEASE INVOLVING NATIVE CORONARY ARTERY OF NATIVE HEART WITHOUT ANGINA PECTORIS: Primary | ICD-10-CM

## 2021-09-10 DIAGNOSIS — K21.9 GASTROESOPHAGEAL REFLUX DISEASE, UNSPECIFIED WHETHER ESOPHAGITIS PRESENT: ICD-10-CM

## 2021-09-10 PROCEDURE — 96127 BRIEF EMOTIONAL/BEHAV ASSMT: CPT | Performed by: FAMILY MEDICINE

## 2021-09-10 PROCEDURE — 99214 OFFICE O/P EST MOD 30 MIN: CPT | Performed by: FAMILY MEDICINE

## 2021-09-10 RX ORDER — AMLODIPINE BESYLATE 2.5 MG/1
2.5 TABLET ORAL DAILY
Qty: 90 TABLET | Refills: 1 | Status: SHIPPED | OUTPATIENT
Start: 2021-09-10 | End: 2022-02-28

## 2021-09-10 RX ORDER — BUPROPION HYDROCHLORIDE 150 MG/1
150 TABLET ORAL DAILY
Qty: 90 TABLET | Refills: 1 | Status: SHIPPED | OUTPATIENT
Start: 2021-09-10 | End: 2022-02-28

## 2021-09-10 ASSESSMENT — PATIENT HEALTH QUESTIONNAIRE - PHQ9
10. IF YOU CHECKED OFF ANY PROBLEMS, HOW DIFFICULT HAVE THESE PROBLEMS MADE IT FOR YOU TO DO YOUR WORK, TAKE CARE OF THINGS AT HOME, OR GET ALONG WITH OTHER PEOPLE: NOT DIFFICULT AT ALL
SUM OF ALL RESPONSES TO PHQ QUESTIONS 1-9: 0
SUM OF ALL RESPONSES TO PHQ QUESTIONS 1-9: 0

## 2021-09-10 ASSESSMENT — ANXIETY QUESTIONNAIRES
6. BECOMING EASILY ANNOYED OR IRRITABLE: NOT AT ALL
8. IF YOU CHECKED OFF ANY PROBLEMS, HOW DIFFICULT HAVE THESE MADE IT FOR YOU TO DO YOUR WORK, TAKE CARE OF THINGS AT HOME, OR GET ALONG WITH OTHER PEOPLE?: NOT DIFFICULT AT ALL
7. FEELING AFRAID AS IF SOMETHING AWFUL MIGHT HAPPEN: NOT AT ALL
4. TROUBLE RELAXING: NOT AT ALL
3. WORRYING TOO MUCH ABOUT DIFFERENT THINGS: NOT AT ALL
GAD7 TOTAL SCORE: 0
2. NOT BEING ABLE TO STOP OR CONTROL WORRYING: NOT AT ALL
GAD7 TOTAL SCORE: 0
GAD7 TOTAL SCORE: 0
7. FEELING AFRAID AS IF SOMETHING AWFUL MIGHT HAPPEN: NOT AT ALL
5. BEING SO RESTLESS THAT IT IS HARD TO SIT STILL: NOT AT ALL
1. FEELING NERVOUS, ANXIOUS, OR ON EDGE: NOT AT ALL

## 2021-09-10 ASSESSMENT — MIFFLIN-ST. JEOR: SCORE: 1895.02

## 2021-09-11 ASSESSMENT — ANXIETY QUESTIONNAIRES: GAD7 TOTAL SCORE: 0

## 2021-09-17 ENCOUNTER — LAB (OUTPATIENT)
Dept: LAB | Facility: CLINIC | Age: 51
End: 2021-09-17
Payer: COMMERCIAL

## 2021-09-17 DIAGNOSIS — E78.5 HYPERLIPIDEMIA LDL GOAL <100: ICD-10-CM

## 2021-09-17 DIAGNOSIS — I25.10 CORONARY ARTERY DISEASE INVOLVING NATIVE CORONARY ARTERY OF NATIVE HEART WITHOUT ANGINA PECTORIS: ICD-10-CM

## 2021-09-17 LAB
ANION GAP SERPL CALCULATED.3IONS-SCNC: 4 MMOL/L (ref 3–14)
BUN SERPL-MCNC: 19 MG/DL (ref 7–30)
CALCIUM SERPL-MCNC: 8.9 MG/DL (ref 8.5–10.1)
CHLORIDE BLD-SCNC: 108 MMOL/L (ref 94–109)
CHOLEST SERPL-MCNC: 160 MG/DL
CO2 SERPL-SCNC: 26 MMOL/L (ref 20–32)
CREAT SERPL-MCNC: 1.14 MG/DL (ref 0.66–1.25)
FASTING STATUS PATIENT QL REPORTED: YES
GFR SERPL CREATININE-BSD FRML MDRD: 74 ML/MIN/1.73M2
GLUCOSE BLD-MCNC: 101 MG/DL (ref 70–99)
HDLC SERPL-MCNC: 56 MG/DL
LDLC SERPL CALC-MCNC: 85 MG/DL
NONHDLC SERPL-MCNC: 104 MG/DL
POTASSIUM BLD-SCNC: 3.9 MMOL/L (ref 3.4–5.3)
SODIUM SERPL-SCNC: 138 MMOL/L (ref 133–144)
TRIGL SERPL-MCNC: 97 MG/DL

## 2021-09-17 PROCEDURE — 80048 BASIC METABOLIC PNL TOTAL CA: CPT

## 2021-09-17 PROCEDURE — 82043 UR ALBUMIN QUANTITATIVE: CPT

## 2021-09-17 PROCEDURE — 36415 COLL VENOUS BLD VENIPUNCTURE: CPT

## 2021-09-17 PROCEDURE — 80061 LIPID PANEL: CPT

## 2021-09-18 LAB
CREAT UR-MCNC: 194 MG/DL
MICROALBUMIN UR-MCNC: 7 MG/L
MICROALBUMIN/CREAT UR: 3.61 MG/G CR (ref 0–17)

## 2021-09-19 ENCOUNTER — HEALTH MAINTENANCE LETTER (OUTPATIENT)
Age: 51
End: 2021-09-19

## 2021-09-19 NOTE — RESULT ENCOUNTER NOTE
-Your urine microalbumin level (which is the urine test that can signal signs of early chronic kidney disease if elevated to >30) is low which is good.  -Your cholesterol panel looks very good with a low LDL (the bad cholesterol) and a pretty good HDL (the good cholesterol).   -Your basic metabolic panel (which includes electrolyte levels, blood sugar level and kidney function tests) looks good other than the just slightly elevated glucose, which is into the pre-diabetic range (100-126, >126 when fasting is in the diabetic range).      Please let me know if you have any questions.  Best,   Rai Hargrove MD

## 2022-01-09 ENCOUNTER — HEALTH MAINTENANCE LETTER (OUTPATIENT)
Age: 52
End: 2022-01-09

## 2022-01-24 ENCOUNTER — TELEPHONE (OUTPATIENT)
Dept: FAMILY MEDICINE | Facility: CLINIC | Age: 52
End: 2022-01-24

## 2022-01-24 ENCOUNTER — VIRTUAL VISIT (OUTPATIENT)
Dept: FAMILY MEDICINE | Facility: CLINIC | Age: 52
End: 2022-01-24
Payer: COMMERCIAL

## 2022-01-24 DIAGNOSIS — M54.50 ACUTE BILATERAL LOW BACK PAIN WITHOUT SCIATICA: Primary | ICD-10-CM

## 2022-01-24 PROCEDURE — 99213 OFFICE O/P EST LOW 20 MIN: CPT | Mod: 95 | Performed by: PHYSICIAN ASSISTANT

## 2022-01-24 RX ORDER — CYCLOBENZAPRINE HCL 10 MG
10 TABLET ORAL 3 TIMES DAILY PRN
Qty: 30 TABLET | Refills: 0 | Status: SHIPPED | OUTPATIENT
Start: 2022-01-24

## 2022-01-24 NOTE — TELEPHONE ENCOUNTER
Pt calling says he has low back soreness and stiffness for the last week.  Requesting medication for the stiffness as it is not going away.  Scheduled pt in for a VV today.    Denisse Guerra RN

## 2022-01-24 NOTE — PROGRESS NOTES
Osiel is a 51 year old who is being evaluated via a billable video visit.      How would you like to obtain your AVS? MyChart  If the video visit is dropped, the invitation should be resent by: Text to cell phone: [phone]  Will anyone else be joining your video visit? No    Video Start Time: 4:11 PM    Assessment & Plan     Acute bilateral low back pain without sciatica    - cyclobenzaprine (FLEXERIL) 10 MG tablet; Take 1 tablet (10 mg) by mouth 3 times daily as needed for muscle spasms  - [facility]; Future                 Return in about 1 week (around 1/31/2022) for If symptoms persist or worsen.    Wil Preciado PA-C  [facility]   Osiel is a 51 year old who presents for the following health issues     HPI   Answers for HPI/ROS submitted by the patient on 1/24/2022  Your back pain is: recurring  Where is your back pain located? : right lower back, left lower back, right middle of back, left middle of back, right upper back, left upper back  How would you describe your back pain? : cramping  Where does your back pain spread? : nowhere  Since you noticed your back pain, how has it changed? : always present, but gets better and worse  Does your back pain interfere with your job?: No    Pain History:  When did you first notice your pain? - Less than 1 week   Have you seen anyone else for your pain? No  Where in your body do you have pain? Back Pain  Onset/Duration: 1 week ago   Description:   Location of pain: low back bilateral, middle of back bilateral and upper back bilateral  Character of pain: cramping  Pain radiation: none  New numbness or weakness in legs, not attributed to pain: no   Intensity: moderate  Progression of Symptoms: a little better   History:   Specific cause: per pt thinks it could be because he picked up some boxes   Pain interferes with job: YES- being uncomfortable   History of back problems: recurrent self limited episodes of low  back pain in the past  Any previous MRI or X-rays: None  Sees a specialist for back pain: No  Alleviating factors:   Improved by: biofreeze and advil     Precipitating factors:  Worsened by: Standing  Therapies tried and outcome: biofreeze     Accompanying Signs & Symptoms:  Risk of Fracture: None  Risk of Cauda Equina: None  Risk of Infection: None  Risk of Cancer: None  Risk of Ankylosing Spondylitis: Onset at age <35, male, AND morning back stiffness  no               Review of Systems   Constitutional, HEENT, cardiovascular, pulmonary, gi and gu systems are negative, except as otherwise noted.      Objective           Vitals:  No vitals were obtained today due to virtual visit.    Physical Exam   GENERAL: Healthy, alert and no distress  EYES: Eyes grossly normal to inspection.  No discharge or erythema, or obvious scleral/conjunctival abnormalities.  RESP: No audible wheeze, cough, or visible cyanosis.  No visible retractions or increased work of breathing.    SKIN: Visible skin clear. No significant rash, abnormal pigmentation or lesions.  NEURO: Cranial nerves grossly intact.  Mentation and speech appropriate for age.  PSYCH: Mentation appears normal, affect normal/bright, judgement and insight intact, normal speech and appearance well-groomed.                Video-Visit Details    Type of service:  Video Visit    Video End Time:4:17 PM    Originating Location (pt. Location): Home    Distant Location (provider location):  [facility]     Platform used for Video Visit: [employer]

## 2022-03-03 NOTE — PROGRESS NOTES
"  Assessment & Plan       ICD-10-CM    1. Coronary artery disease involving native coronary artery of native heart without angina pectoris  I25.10 amLODIPine (NORVASC) 2.5 MG tablet     REVIEW OF HEALTH MAINTENANCE PROTOCOL ORDERS   2. Hyperlipidemia LDL goal <100  E78.5 atorvastatin (LIPITOR) 40 MG tablet     REVIEW OF HEALTH MAINTENANCE PROTOCOL ORDERS   3. Anxiety state  F41.1 buPROPion (WELLBUTRIN SR) 100 MG 12 hr tablet     REVIEW OF HEALTH MAINTENANCE PROTOCOL ORDERS   4. Major depressive disorder, recurrent, moderate (H)  F33.1 buPROPion (WELLBUTRIN SR) 100 MG 12 hr tablet     REVIEW OF HEALTH MAINTENANCE PROTOCOL ORDERS   5. Colon cancer screening  Z12.11 Adult Gastro Ref - Procedure Only      CAD/lipids- Pt continues to do very well with meds and improvement in diet.  Exercise has been worse lately with weather and back injury, but hopes to start running again soon.   Will cont current meds- atorvastatin, amlodipine, asa.  Fasting labs prior to next 6 month visit/physical if able- HM order placed.    Anxiety/MDD - PHQ/YOGESH scores have been very low for a few yrs now.  0's today as well.  He doesn't recall trying off - maybe once a long time ago?  He thinks he had bad low motivation sx's for a few months prior to going on meds, but doesn't recall yrs of sx's prior.  Also wonders if he's easier to tear up on meds.  Would be up for trying to go off, especially with spring/summer coming, no recent stressors.  Will wean down with XL tabs every other day x 1 wk, then SR tabs every other day for a week then off.  Rec f/u video visit in ~3 months for recheck of mood/anxiety sx's off meds.    Colon cancer screening- referral placed for colonoscopy.         BMI:   Estimated body mass index is 27.03 kg/m  as calculated from the following:    Height as of 9/10/21: 1.892 m (6' 2.49\").    Weight as of this encounter: 96.8 kg (213 lb 4.8 oz).   Weight management plan: Discussed healthy diet and exercise " Is This A New Presentation, Or A Follow-Up?: Skin Lesions Additional History: Patient states that she was previously receiving ILK injections for alopecia at her old dermatologist. 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 Patient states that is currently using Ciclopirox and Women’s Rogaine. guidelines        Return in about 6 months (around 9/4/2022) for Physical Exam, Fasting labs at appointment, 3 month video visit for mdd/anxiety off wellbutrin.    Danika Hargrove MD  [facility] is a 51 year old who presents for the following health issues- CAD, hyperlipidemia, anxiety, MDD    History of Present Illness     Reason for visit:  Check up and prescription renewal    He eats 2-3 servings of fruits and vegetables daily.He consumes 0 sweetened beverage(s) daily.He exercises with enough effort to increase his heart rate 9 or less minutes per day.  He exercises with enough effort to increase his heart rate 3 or less days per week.   He is taking medications regularly.       CAD/Hyperlipidemia Follow-Up     Are you regularly taking any medication or supplement to lower your cholesterol?   Yes- lipitor    Are you having muscle aches or other side effects that you think could be caused by your cholesterol lowering medication?  No    Still eating better.  More veggies and fruits.  Much less red meat.  Had to slow down on exercise due to back pain after lifting.  Getting better, hoping to start running again.      Depression and Anxiety Follow-Up - on wellbutrin XL    How are you doing with your depression since your last visit? Improved     How are you doing with your anxiety since your last visit?  Improved     Are you having other symptoms that might be associated with depression or anxiety? No    Have you had a significant life event? No     Do you have any concerns with your use of alcohol or other drugs? No      Prior to meds, debilitating depression sx's (no si thoughts), very low motivation, didn't want do anything.  Doesn't think he had sx's for yrs prior to going on meds, doesn't recall having low mood otherwise during his childhood/adulthood.  Doesn't recall any big event prior to going on meds, though.   Mom has depression.  She wasn't on meds  for a long time, but now that she has been, she wished she had done it a long time ago.    Se's-  Since he's been on meds, he feels like his emotions are close to the surface.  Can start crying easily.  Would be up for trying to wean down/off medications.  Never tried prior.    Social History     Tobacco Use     Smoking status: Former Smoker     Packs/day: 0.25     Years: 20.00     Pack years: 5.00     Types: Cigarettes     Quit date: 2012     Years since quittin.6     Smokeless tobacco: Never Used     Tobacco comment: stopped after chest pain ER visit in    Substance Use Topics     Alcohol use: Yes     Comment: 1-2 drinks week     Drug use: No     Comment: Sober from marijuana since 05     PHQ 9/10/2021 9/10/2021 9/10/2021   PHQ-9 Total Score 0 0 0   Q9: Thoughts of better off dead/self-harm past 2 weeks Not at all Not at all Not at all     YOGESH-7 SCORE 2019 2020 9/10/2021   Total Score - - -   Total Score - 0 (minimal anxiety) 0 (minimal anxiety)   Total Score 0 0 0            Review of Systems   Constitutional, HEENT, cardiovascular, pulmonary, gi and gu systems are negative, except as otherwise noted.      Objective    /74   Pulse 58   Temp 97.5  F (36.4  C) (Temporal)   Wt 96.8 kg (213 lb 4.8 oz)   SpO2 97%   BMI 27.03 kg/m    Body mass index is 27.03 kg/m .  Physical Exam   GENERAL APPEARANCE: healthy, alert and no distress     EYES: sclera clear, EOMI     RESP: lungs clear to auscultation - no rales, rhonchi or wheezes     CV: regular rates and rhythm, normal S1 S2, no S3 or S4 and no murmur, click or rub      Ext: warm, dry, no edema       Psych: full range affect, normal speech and grooming, judgement and insight intact     Lab on 2021   Component Date Value Ref Range Status     Cholesterol 2021 160  <200 mg/dL Final     Triglycerides 2021 97  <150 mg/dL Final     Direct Measure HDL 2021 56  >=40 mg/dL Final     LDL Cholesterol Calculated  09/17/2021 85  <=100 mg/dL Final     Non HDL Cholesterol 09/17/2021 104  <130 mg/dL Final     Patient Fasting > 8hrs? 09/17/2021 Yes   Final     Sodium 09/17/2021 138  133 - 144 mmol/L Final     Potassium 09/17/2021 3.9  3.4 - 5.3 mmol/L Final     Chloride 09/17/2021 108  94 - 109 mmol/L Final     Carbon Dioxide (CO2) 09/17/2021 26  20 - 32 mmol/L Final     Anion Gap 09/17/2021 4  3 - 14 mmol/L Final     Urea Nitrogen 09/17/2021 19  7 - 30 mg/dL Final     Creatinine 09/17/2021 1.14  0.66 - 1.25 mg/dL Final     Calcium 09/17/2021 8.9  8.5 - 10.1 mg/dL Final     Glucose 09/17/2021 101 (A) 70 - 99 mg/dL Final     GFR Estimate 09/17/2021 74  >60 mL/min/1.73m2 Final    As of July 11, 2021, eGFR is calculated by the CKD-EPI creatinine equation, without race adjustment. eGFR can be influenced by muscle mass, exercise, and diet. The reported eGFR is an estimation only and is only applicable if the renal function is stable.     Creatinine Urine mg/dL 09/17/2021 194  mg/dL Final     Albumin Urine mg/L 09/17/2021 7  mg/L Final     Albumin Urine mg/g Cr 09/17/2021 3.61  0.00 - 17.00 mg/g Cr Final

## 2022-03-04 ENCOUNTER — OFFICE VISIT (OUTPATIENT)
Dept: FAMILY MEDICINE | Facility: CLINIC | Age: 52
End: 2022-03-04
Payer: COMMERCIAL

## 2022-03-04 VITALS
WEIGHT: 213.3 LBS | HEART RATE: 58 BPM | BODY MASS INDEX: 27.03 KG/M2 | SYSTOLIC BLOOD PRESSURE: 117 MMHG | DIASTOLIC BLOOD PRESSURE: 74 MMHG | OXYGEN SATURATION: 97 % | TEMPERATURE: 97.5 F

## 2022-03-04 DIAGNOSIS — F33.1 MAJOR DEPRESSIVE DISORDER, RECURRENT, MODERATE (H): ICD-10-CM

## 2022-03-04 DIAGNOSIS — F41.1 ANXIETY STATE: ICD-10-CM

## 2022-03-04 DIAGNOSIS — Z12.11 COLON CANCER SCREENING: ICD-10-CM

## 2022-03-04 DIAGNOSIS — I25.10 CORONARY ARTERY DISEASE INVOLVING NATIVE CORONARY ARTERY OF NATIVE HEART WITHOUT ANGINA PECTORIS: Primary | ICD-10-CM

## 2022-03-04 DIAGNOSIS — E78.5 HYPERLIPIDEMIA LDL GOAL <100: ICD-10-CM

## 2022-03-04 PROCEDURE — 99214 OFFICE O/P EST MOD 30 MIN: CPT | Performed by: FAMILY MEDICINE

## 2022-03-04 RX ORDER — BUPROPION HYDROCHLORIDE 100 MG/1
100 TABLET, EXTENDED RELEASE ORAL EVERY OTHER DAY
Qty: 5 TABLET | Refills: 0 | Status: SHIPPED | OUTPATIENT
Start: 2022-03-04 | End: 2022-03-26

## 2022-03-04 RX ORDER — AMLODIPINE BESYLATE 2.5 MG/1
2.5 TABLET ORAL DAILY
Qty: 90 TABLET | Refills: 1 | Status: SHIPPED | OUTPATIENT
Start: 2022-03-04 | End: 2022-09-26

## 2022-03-04 RX ORDER — ATORVASTATIN CALCIUM 40 MG/1
40 TABLET, FILM COATED ORAL DAILY
Qty: 90 TABLET | Refills: 1 | Status: SHIPPED | OUTPATIENT
Start: 2022-03-04 | End: 2022-10-26

## 2022-03-14 ENCOUNTER — TELEPHONE (OUTPATIENT)
Dept: GASTROENTEROLOGY | Facility: CLINIC | Age: 52
End: 2022-03-14
Payer: COMMERCIAL

## 2022-03-14 DIAGNOSIS — Z11.59 ENCOUNTER FOR SCREENING FOR OTHER VIRAL DISEASES: Primary | ICD-10-CM

## 2022-03-14 NOTE — TELEPHONE ENCOUNTER
Endo Screening Questions  BlueKIND OF PREP RedLOCATION [review exclusion criteria] GreenSEDATION TYPE  1. Are you active on mychart? Y    2. What insurance is in the chart? BCBS     3.  Ordering/Referring Provider: Danika Hargrove    4. BMI 27.3 [BMI OVER 40-EXTENDED PREP]  If greater than 40 review exclusion criteria [PAC APPT IF @ [facility]]      5.  Respiratory Screening :  [If yes to any of the following HOSPITAL setting only]     Do you use daily home oxygen? N    Do you have mod to severe Obstructive Sleep Apnea? N  [OKAY @ [facility] SH PH RI]   Do you have Pulmonary Hypertension? N     Do you have UNCONTROLLED asthma? N      6. Have you had a heart or lung transplant? N      7. Are you currently on dialysis? N [ If yes, G-PREP & HOSPITAL setting only]     8. Do you have chronic kidney disease? N [ If yes, G-PREP ]    9. Have you had a stroke or Transient ischemic attack (TIA) within 6 months? N   (If yes, do not schedule at [facility])    10. In the past 6 months, have you had any heart related issues including cardiomyopathy or heart attack? N        If yes, did it require cardiac stenting or other implantable device? N      11. Do you have any implantable devices in your body (pacemaker, defib, LVAD)? N (If yes, schedule at [facility])    12. Do you take nitroglycerin? N   If yes, how often? N  (if yes, HOSPITAL setting ONLY)    13. Are you currently taking any blood thinners? N   [IF YES, INFORM PATIENT TO FOLLOW UP W/ ORDERING PROVIDER FOR BRIDGING INSTRUCTIONS]     14. Are you a diabetic? N   [ If yes, G-PREP ]    15. [FEMALES] Are you currently pregnant? N    If yes, how many weeks? N    16. Are you taking any prescription pain medications on a routine schedule?  N  [ If yes, EXTENDED PREP.]    17. Do you have any chemical dependencies such as alcohol, street drugs, or methadone?  N     18. Do you have any history of post-traumatic stress syndrome, severe anxiety or history of psychosis?  N      19. Do you  transfer independently?  Y    20.  Do you have any issues with constipation?  N  [ If yes, EXTENDED PREP.]    21. Preferred LOCAL Pharmacy for Pre Prescription     [id number] - [place], [place] - [address]      Scheduling Details      Caller : Rob  (Please ask for phone number if not scheduled by patient)    Type of Procedure Scheduled: Lower Endoscopy [Colonoscopy]  Which Colonoscopy Prep was Sent?: MPREP  DESI CF PATIENTS & GROEN'S PATIENTS NEEDS EXTENDED PREP  Surgeon: JULISA  Date of Procedure: 04/22  Location:       Sedation Type: CS  Conscious Sedation- Needs  for 6 hours after the procedure  MAC/General-Needs  for 24 hours after procedure    Pre-op Required at [facility], [place], [place] and OR for MAC sedation: N  (advise patient they will need a pre-op prior to procedure -)      Informed patient they will need an adult  Y  Cannot take any type of public or medical transportation alone    [facility]Pro[facility] or Externally: [facility]    Confirmed Nurse will call to complete assessment Y    Additional comments:

## 2022-04-19 ENCOUNTER — LAB (OUTPATIENT)
Dept: LAB | Facility: CLINIC | Age: 52
End: 2022-04-19
Payer: COMMERCIAL

## 2022-04-19 DIAGNOSIS — Z11.59 ENCOUNTER FOR SCREENING FOR OTHER VIRAL DISEASES: ICD-10-CM

## 2022-04-19 PROCEDURE — U0003 INFECTIOUS AGENT DETECTION BY NUCLEIC ACID (DNA OR RNA); SEVERE ACUTE RESPIRATORY SYNDROME CORONAVIRUS 2 (SARS-COV-2) (CORONAVIRUS DISEASE [COVID-19]), AMPLIFIED PROBE TECHNIQUE, MAKING USE OF HIGH THROUGHPUT TECHNOLOGIES AS DESCRIBED BY CMS-2020-01-R: HCPCS

## 2022-04-19 PROCEDURE — U0005 INFEC AGEN DETEC AMPLI PROBE: HCPCS

## 2022-04-20 LAB — SARS-COV-2 RNA RESP QL NAA+PROBE: NEGATIVE

## 2022-04-22 ENCOUNTER — HOSPITAL ENCOUNTER (OUTPATIENT)
Facility: CLINIC | Age: 52
Discharge: HOME | End: 2022-04-22
Attending: SURGERY | Admitting: SURGERY
Payer: COMMERCIAL

## 2022-04-22 VITALS
SYSTOLIC BLOOD PRESSURE: 114 MMHG | DIASTOLIC BLOOD PRESSURE: 71 MMHG | WEIGHT: 210 LBS | OXYGEN SATURATION: 95 % | HEIGHT: 74 IN | RESPIRATION RATE: 17 BRPM | HEART RATE: 55 BPM | BODY MASS INDEX: 26.95 KG/M2

## 2022-04-22 LAB — COLONOSCOPY: NORMAL

## 2022-04-22 PROCEDURE — G0500 MOD SEDAT ENDO SERVICE >5YRS: HCPCS | Performed by: SURGERY

## 2022-04-22 PROCEDURE — 88305 TISSUE EXAM BY PATHOLOGIST: CPT | Mod: 26 | Performed by: PATHOLOGY

## 2022-04-22 PROCEDURE — 99153 MOD SED SAME PHYS/QHP EA: CPT | Mod: 59 | Performed by: SURGERY

## 2022-04-22 PROCEDURE — 45385 COLONOSCOPY W/LESION REMOVAL: CPT | Mod: PT | Performed by: SURGERY

## 2022-04-22 PROCEDURE — 99153 MOD SED SAME PHYS/QHP EA: CPT

## 2022-04-22 PROCEDURE — 88305 TISSUE EXAM BY PATHOLOGIST: CPT | Mod: TC | Performed by: SURGERY

## 2022-04-22 PROCEDURE — 99152 MOD SED SAME PHYS/QHP 5/>YRS: CPT | Mod: 59 | Performed by: SURGERY

## 2022-04-22 PROCEDURE — 250N000011 HC RX IP 250 OP 636: Performed by: SURGERY

## 2022-04-22 RX ORDER — ONDANSETRON 2 MG/ML
4 INJECTION INTRAMUSCULAR; INTRAVENOUS
Status: DISCONTINUED | OUTPATIENT
Start: 2022-04-22 | End: 2022-04-22 | Stop reason: HOSPADM

## 2022-04-22 RX ORDER — LIDOCAINE 40 MG/G
CREAM TOPICAL
Status: DISCONTINUED | OUTPATIENT
Start: 2022-04-22 | End: 2022-04-22 | Stop reason: HOSPADM

## 2022-04-22 RX ORDER — FENTANYL CITRATE 50 UG/ML
INJECTION, SOLUTION INTRAMUSCULAR; INTRAVENOUS PRN
Status: COMPLETED | OUTPATIENT
Start: 2022-04-22 | End: 2022-04-22

## 2022-04-22 RX ADMIN — FENTANYL CITRATE 100 MCG: 50 INJECTION, SOLUTION INTRAMUSCULAR; INTRAVENOUS at 10:56

## 2022-04-22 RX ADMIN — MIDAZOLAM 2 MG: 1 INJECTION INTRAMUSCULAR; INTRAVENOUS at 10:57

## 2022-04-22 NOTE — OP NOTE
General Surgery Brief Operative Note    Preoperative Diagnosis- Screening for malignant neoplasm    Postoperative Diagnosis- Same    Procedure- Colonoscopy with snare polypecteomy    Surgeon- Allen    Anesthesia- Conscious sedation for 25 minutes    EBL- Minimal    Findings- 7 mm polyp in sigmoid    Specimen- Polyp    Complications- None    Ti Villa M.D.  [facility]  [phone]

## 2022-04-22 NOTE — H&P
[facility] Anesthesia Pre-op History and Physical    Rbo Beltre MRN# [id number]   Age: 51 year old YOB: 1970      Date of Surgery: 4/22/2022 [facility]      Date of Exam 4/22/2022 Facility (Same day)     Primary care provider: Danika Hargrove         Chief Complaint and/or Reason for Procedure:   Screening for colon malignancy         Active problem list:     Patient Active Problem List    Diagnosis Date Noted     Snoring 11/17/2020     Priority: Medium     Nonspecific abnormal electrocardiogram (ECG) (EKG) 11/22/2014     Priority: Medium     9/14 EKG at [facility]--   EKG: A 12 lead EKG performed in the office and personally reviewed revealed sinus bradycardia, incomplete RBBB, no ST/T wave abnormality. No change from last year.       CAD (coronary artery disease) 08/24/2012     Priority: Medium     7/12- [facility] ER for chest pain, trop elevation (ID thought not myocarditis).  Coronary angiogram-  showing mild LAD stenosis. Fam h/o early CAD.    Cards goals- lifetime asa, b-blocker (stopped due to fatigue, switched to norvasc, down to 5mg/d with increased edema on 10mg/d), life-long statin with goal LDL < 70. Pt commended for quitting smoking.  Rec sleep study to r/o KATHERINE - snores.  11/14 update- Cardiology rec f/u only prn after 9/14 visit.   Impression/Plan as below...  Noted he can f/u prn.  Dx mild nonobstructive CAD- 7/12 episode possibly coronary spasm. Rec lifelong asa, statin as tolerated (b-blocker not tolerated). Rec treating LDL to <100. Consider sleep study to r/o KATHERINE.    ''IMPRESSION:  1. Troponin elevation. ID thought not likely myocarditis. Possible coronary spasm.   2. Mild nonobstructive CAD. Continue ASA lifelong, statin as tolerated.   3. Dyslipidemia. Recommend treating LDL < 100.   4. Snoring. Consider sleep study to rule out KATHERINE. Patient deferred today.   5. Overweight. Discussed diet, exercise and weight loss.   6. Sinus  bradycardia, asymptomatic.   PLAN  1. Continue current medications.   2. Recommend increasing home activity as tolerated. Goal to achieve 30- 60 minutes of moderate intensity activity most days of the week. Find something that you enjoy doing and try to stick to it.  3. Follow a low cholesterol, low sodium, low sugar, Mediterranean diet. Avoid fried foods, trans fats, and processed foods. Eliminate white refined sugars and flour. Eat at least 5 servings of vegetables and fruits daily. Include fiber, whole grains, lean meat and healthy fats: fish/fish oil, flax seed/meal/oil, olive oil, avocado, nuts (small portion).   4. Use nitro as needed for severe chest pain like what brought you to the hospital. Call 911 if not relieved after 2 tabs.  5. Follow up with us as needed for return symptoms or new concerns.    Thank you for the opportunity to participate in your patient's care. Please call with questions or concerns.''         GERD (gastroesophageal reflux disease) 09/27/2011     Priority: Medium     nexium- few years.    9/21- will try wean with pepcid to see if able to get off.  Likely started on it when weight was higher.       Hyperlipidemia LDL goal <100 10/31/2010     Priority: Medium     Simvastatin started in '08 for LDL in 190s.    LDL goal lowered to <70 in 7/12 after possible CAD spasm, angiogram showed mild LAD atherosclerosis. 7/12 LDL 87.  Cardiology did not change dose- still 20mg/d.  Simvastatin 40mg changed to lipitor 20mg/d in later '13 (concern with norvasc interaction).  LDL 94 in 2/14.  Rec f/u  In 4/14- discuss increasing lipitor...  9/14- cards consult did not discuss increasing lipitor- just rec LDL goal of <100       Major depressive disorder, recurrent, moderate (H)      Priority: Medium     Wellbutrin XL 150mg/d since 1/17 (meant to add it to citalopram, but pt stopped citalopram, just took wellbutrin).  Previously citalopram helping, back on since 1/08 (sx's worsened after trial off for  "1-2 months), initially started in '05.      *Weight*- gained ~20 lbs (to ~215 lbs) in first 6 months on citalopram, then stabilized for awhile.  Gained another ~20 lbs after dose increase to 40mg/d in '10 (up to ~235 lbs).  Stable in this range from '13-'17.  Weight down ~25 lbs with switch to wellbutrin in ~11/16 (though pt also started eating much better around that time).       Anxiety state      Priority: Medium     Wellbutrin XL 150mg/d.  Previously had been on citalopram  20mg/d (no se's on it).  Anxiety meds started in ~4/05.  Tried going off for about a month or 1-2 (in summer, did okay, but worsened again in Fall/Winter).  Switched from citalopram to wellbutrin in 11/16.           Allergic rhinitis due to other allergen 04/11/2005     Priority: Medium            Medications (include herbals and vitamins):   Any Plavix use in the last 7 days? No     Current Facility-Administered Medications   Medication     lidocaine (LMX4) cream     lidocaine 1 % 0.1-1 mL     ondansetron (ZOFRAN) injection 4 mg     sodium chloride (PF) 0.9% PF flush 3 mL     sodium chloride (PF) 0.9% PF flush 3 mL             Allergies:      Allergies   Allergen Reactions     Dust Mite Extract           Physical Exam:   All vitals have been reviewed  Patient Vitals for the past 8 hrs:   BP Resp SpO2 Height Weight   04/22/22 1036 122/85 9 96 % 1.88 m (6' 2\") 95.3 kg (210 lb)     No intake/output data recorded.  Lungs:   No increased work of breathing, good air exchange, clear to auscultation bilaterally, no crackles or wheezing     Cardiovascular:   normal apical pulses              Lab / Radiology Results:   Reviewed         Anesthetic risk and/or ASA classification:   ASA Class 2    Ti Villa MD     "

## 2022-04-25 LAB
PATH REPORT.COMMENTS IMP SPEC: NORMAL
PATH REPORT.COMMENTS IMP SPEC: NORMAL
PATH REPORT.FINAL DX SPEC: NORMAL
PATH REPORT.GROSS SPEC: NORMAL
PATH REPORT.MICROSCOPIC SPEC OTHER STN: NORMAL
PATH REPORT.RELEVANT HX SPEC: NORMAL
PHOTO IMAGE: NORMAL

## 2022-10-26 ENCOUNTER — VIRTUAL VISIT (OUTPATIENT)
Dept: FAMILY MEDICINE | Facility: CLINIC | Age: 52
End: 2022-10-26
Payer: COMMERCIAL

## 2022-10-26 DIAGNOSIS — F33.42 MAJOR DEPRESSIVE DISORDER, RECURRENT EPISODE, IN FULL REMISSION (H): ICD-10-CM

## 2022-10-26 DIAGNOSIS — M62.830 SPASM OF MUSCLE OF LOWER BACK: Primary | ICD-10-CM

## 2022-10-26 DIAGNOSIS — E78.5 HYPERLIPIDEMIA LDL GOAL <100: ICD-10-CM

## 2022-10-26 PROCEDURE — 99213 OFFICE O/P EST LOW 20 MIN: CPT | Mod: 95 | Performed by: FAMILY MEDICINE

## 2022-10-26 RX ORDER — ATORVASTATIN CALCIUM 40 MG/1
40 TABLET, FILM COATED ORAL DAILY
Qty: 90 TABLET | Refills: 0 | Status: SHIPPED | OUTPATIENT
Start: 2022-10-26 | End: 2023-01-25

## 2022-10-26 NOTE — PROGRESS NOTES
Osiel is a 52 year old who is being evaluated via a billable video visit.      How would you like to obtain your AVS? MyChart  If the video visit is dropped, the invitation should be resent by: Text to cell phone: [phone]  Will anyone else be joining your video visit? No        Assessment & Plan       ICD-10-CM    1. Spasm of muscle of lower back  M62.830 Physical Therapy Referral      2. Hyperlipidemia LDL goal <100  E78.5 atorvastatin (LIPITOR) 40 MG tablet      3. Major depressive disorder, recurrent episode, in full remission (H)  F33.42 Physical Therapy Referral         R low back spasm- worse in am, bit better in pm, not affecting sleep.  Flexeril not helpful.  Will have him increase ibuprofen 400mg TID, alternating with tylenol 1000mg BID.  Also rec lidocaine patch OTC, along with heat/ice, stretching.  Referred to PT acute back pain option.    MDD- pt weaned off wellbutrin XL in 3/22 (after last appt), and feels he's been doing fine, though bit worried about SAD with upcoming winter.  Will cont Vit D, consider light box, and exercise regularly when back better.    Lipids- refills sent for lipitor to get him to next appt in 1/23.    Danika Hargrove MD  [facility]          Subjective   Osiel is a 52 year old, presenting for the following health issues:  Back Pain      HPI     Pain History:  When did you first notice your pain? - Acute Pain, started back Sun around noon, was doing yard work, leaves, started to get tight  Have you seen anyone else for your pain? No  Where in your body do you have pain? Back Pain  Onset/Duration: chronic but intermittent - last episode Jan 2022  Description:   Location of pain: low back both  Character of pain: dull ache and constant, feels really tight, pain if he turns/twists the wrong way, hard to stand up straight, more pain  Pain radiation: none  New numbness or weakness in legs, not attributed to pain: no   Intensity: Currently  5-6/10  Progression of Symptoms: waxing and waning  History:   Specific cause: was picking up leaves - cleaning lawn  Pain interferes with job: YES - hard to concentrate d/t pain  History of back problems: recurrent self limited episodes of low back pain in the past  Any previous MRI or X-rays: None  Sees a specialist for back pain: No  Alleviating factors:   Improved by: rest    Precipitating factors:  Worsened by: Lifting, Bending and Standing  Therapies tried and outcome: cold, muscle relaxants and NSAIDs    Worse in the morning, bit better in the evening.  Okay at night, can find comfortable position on his side, still hurrts but okay, more pain if he rolls over on back.    Knot above hip on R side.  Area of constant feeling of tightness.  If worse, same area, but twinges to lower back.    Pain at best 5-6/10, manageable, able to go to work.  At worst, picking up dog bowl, up to 8-9/10.  Better soon if he sits down.    Sx's pretty stable over the last few days, though harder to walk.    Has some flexeril at home, from 1/22 episode.  Unsure if it does anything for him.    Advil- 2 tabs, maybe twice a day.  Was using ice.      Accompanying Signs & Symptoms:  Risk of Fracture: None  Risk of Cauda Equina: None  Risk of Infection: None  Risk of Cancer: None  Risk of Ankylosing Spondylitis: Onset at age <35, male, AND morning back stiffness  no       MDD/anxiety- did go off wellbutrin after 3/22, and has been doing fine.  Is worried about winter.  Taking Vit D- maybe 1000 units.  Still does better if he's not active, so tries to be more active.  Can get in a funk with a few days of cloudy days.      Review of Systems   Constitutional, HEENT, cardiovascular, pulmonary, gi and gu systems are negative, except as otherwise noted.        Objective     Vitals:  No vitals were obtained today due to virtual visit.    Physical Exam   GENERAL: Healthy, alert and no distress  EYES: Eyes grossly normal to inspection.  No  discharge or erythema, or obvious scleral/conjunctival abnormalities.  RESP: No audible wheeze, cough, or visible cyanosis.  No visible retractions or increased work of breathing.    SKIN: Visible skin clear. No significant rash, abnormal pigmentation or lesions.  NEURO: Cranial nerves grossly intact.  Mentation and speech appropriate for age.  PSYCH: Mentation appears normal, affect normal/bright, judgement and insight intact, normal speech and appearance well-groomed.              Video-Visit Details    Video Start Time: 1:02 PM    Type of service:  Video Visit    Video End Time:1:19 PM    Originating Location (pt. Location): Home    Distant Location (provider location):  On-site    Platform used for Video Visit: Milo

## 2022-11-03 ENCOUNTER — THERAPY VISIT (OUTPATIENT)
Dept: PHYSICAL THERAPY | Facility: CLINIC | Age: 52
End: 2022-11-03
Attending: FAMILY MEDICINE
Payer: COMMERCIAL

## 2022-11-03 DIAGNOSIS — M62.830 SPASM OF MUSCLE OF LOWER BACK: Primary | ICD-10-CM

## 2022-11-03 DIAGNOSIS — F33.42 MAJOR DEPRESSIVE DISORDER, RECURRENT EPISODE, IN FULL REMISSION (H): ICD-10-CM

## 2022-11-03 PROCEDURE — 97112 NEUROMUSCULAR REEDUCATION: CPT | Mod: GP | Performed by: PHYSICAL THERAPIST

## 2022-11-03 PROCEDURE — 97110 THERAPEUTIC EXERCISES: CPT | Mod: GP | Performed by: PHYSICAL THERAPIST

## 2022-11-03 PROCEDURE — 97161 PT EVAL LOW COMPLEX 20 MIN: CPT | Mod: GP | Performed by: PHYSICAL THERAPIST

## 2022-11-03 NOTE — PROGRESS NOTES
Physical Therapy Initial Evaluation  Subjective:    Patient Health History  Rob Beltre being seen for Back pain.     Problem began: 10/23/2022.   Problem occurred: After performing yard work(fall clean-up) several days in a row   Pain is reported as 5/10 on pain scale.  General health as reported by patient is good.  Pertinent medical history includes: high blood pressure.   Red flags:  None as reported by patient.  Medical allergies: none.   Surgeries include:  Other. Other surgery history details: [place] teeth removed.    Current medications:  Anti-inflammatory and high blood pressure medication.    Current occupation is Operations.   Primary job tasks include:  Computer work and prolonged sitting.                  Therapist Generated HPI Evaluation         Type of problem:  Lumbar.    This is a new condition.  Condition occurred with:  Repetition/overuse (yard work).  Where condition occurred: at home.  Patient reports pain:  Lumbar spine right and central lumbar spine.  Pain is described as cramping and shooting and is intermittent.  Pain radiates to:  No radiation. Pain is worse in the A.M..  Since onset symptoms are gradually improving.  Associated symptoms:  Loss of motion/stiffness. Symptoms are exacerbated by bending, standing, lifting, walking and carrying  and relieved by ice, heat and NSAID's.  Imaging testing: none.    Restrictions due to condition include:  Working in normal job without restrictions.  Barriers include:  None as reported by patient.                        Objective:    Gait:    Gait Type:  Normal         Flexibility/Screens:         Spine:      Decreased right spine flexibility:  Quadratus Lumborum             Lumbar/SI Evaluation    Lumbar Myotomes:  normal                  Neural Tension/Mobility:      Left side:SLR  negative.   Right side:   SLR w/DF positive.  Right side:   Femoral Nerve; Slump or SLR  negative.         Spinal Segmental Conclusions:     Level: Hypo noted at L1,  L2, L3, L4, L5 and S1                                                     Jules Lumbar Evaluation    Posture:  Sitting: poor  Standing: fair  Lordosis: Reduced  Lateral Shift: no  Correction of Posture: better    Movement Loss:  Flexion (Flex): nil and pain  Extension (EXT): major and pain  Side Novice R (SG R): min and pain  Side Novice L (SG L): min and pain  Test Movements:  FIS: During: no effect  After: no effect  Mechanical Response: no effectPretest Movements: central and R lumbar  Repeat FIS: During: increases  After: no worse  Mechanical Response: no effect  EIS: During: increases  After: no worse  Mechanical Response: no effect  Repeat EIS: During: centralizing  After: no better  Mechanical Response: no effect    EIL: During: centralizing  After: no better  Mechanical Response: no effect  Repeat EIL: During: centralizing  After: better  Mechanical Response: IncROM          Principle of Treatment:  Posture Correction: slouch/over correct, location of neutral spine, use of lumbar support, educated pt in disc model of pathology to explain how the exercise may help his symptoms, educated pt in traffic light guide for pain, and frequency of HEP needed to test for therapeutic effect.  Also emphasized stopping exercise if hegets a red light and contact clinic.    Extension: Prone lying, SHAMEKA's, press-ups, EIS(sagging hips into counter)                                           ROS    Assessment/Plan:    Patient is a 52 year old male with lumbar complaints.  Positive response to MDT/Jules principles applied in clinic today.  Patient has the following significant findings with corresponding treatment plan.                Diagnosis 1:  Lumbar muscle spasms(LBP without sciatica)  Pain -  manual therapy, self management, education, directional preference exercise and home program  Decreased ROM/flexibility - manual therapy, therapeutic exercise, therapeutic activity and home program  Decreased joint mobility -  manual therapy, therapeutic exercise, therapeutic activity and home program  Decreased function - therapeutic activities and home program  Impaired posture - neuro re-education, therapeutic activities and home program    Cumulative Therapy Evaluation is: Low complexity.    Previous and current functional limitations:  (See Goal Flow Sheet for this information)    Short term and Long term goals: (See Goal Flow Sheet for this information)     Communication ability:  Patient appears to be able to clearly communicate and understand verbal and written communication and follow directions correctly.  Treatment Explanation - The following has been discussed with the patient:   RX ordered/plan of care  Anticipated outcomes  Possible risks and side effects  This patient would benefit from PT intervention to resume normal activities.   Rehab potential is good.    Frequency:  1 X week, once daily  Duration:  for 4-6 weeks  Discharge Plan:  Achieve all LTG.  Independent in home treatment program.  Reach maximal therapeutic benefit.    Please refer to the daily flowsheet for treatment today, total treatment time and time spent performing 1:1 timed codes.

## 2022-11-08 ENCOUNTER — THERAPY VISIT (OUTPATIENT)
Dept: PHYSICAL THERAPY | Facility: CLINIC | Age: 52
End: 2022-11-08
Payer: COMMERCIAL

## 2022-11-08 DIAGNOSIS — M62.830 SPASM OF MUSCLE OF LOWER BACK: Primary | ICD-10-CM

## 2022-11-08 PROCEDURE — 97112 NEUROMUSCULAR REEDUCATION: CPT | Mod: GP | Performed by: PHYSICAL THERAPIST

## 2022-11-08 PROCEDURE — 97140 MANUAL THERAPY 1/> REGIONS: CPT | Mod: GP | Performed by: PHYSICAL THERAPIST

## 2022-11-08 PROCEDURE — 97110 THERAPEUTIC EXERCISES: CPT | Mod: GP | Performed by: PHYSICAL THERAPIST

## 2022-11-21 ENCOUNTER — HEALTH MAINTENANCE LETTER (OUTPATIENT)
Age: 52
End: 2022-11-21

## 2023-01-20 DIAGNOSIS — I25.10 CORONARY ARTERY DISEASE INVOLVING NATIVE CORONARY ARTERY OF NATIVE HEART WITHOUT ANGINA PECTORIS: ICD-10-CM

## 2023-01-20 RX ORDER — AMLODIPINE BESYLATE 2.5 MG/1
2.5 TABLET ORAL DAILY
Qty: 90 TABLET | Refills: 0 | Status: SHIPPED | OUTPATIENT
Start: 2023-01-20 | End: 2023-01-25

## 2023-01-20 NOTE — TELEPHONE ENCOUNTER
"Requested Prescriptions   Pending Prescriptions Disp Refills     amLODIPine (NORVASC) 2.5 MG tablet [Pharmacy Med Name: AMLODIPINE BESYLATE 2.5 MG TAB] 90 tablet 0     Sig: TAKE 1 TABLET BY MOUTH EVERY DAY       Calcium Channel Blockers Protocol  Failed - 1/20/2023 12:22 AM        Failed - Normal serum creatinine on file in past 12 months     Recent Labs   Lab Test 09/17/21  0824   CR 1.14       Ok to refill medication if creatinine is low          Passed - Blood pressure under 140/90 in past 12 months     BP Readings from Last 3 Encounters:   04/22/22 114/71   03/04/22 117/74   09/10/21 117/73                 Passed - Recent (12 mo) or future (30 days) visit within the authorizing provider's specialty     Patient has had an office visit with the authorizing provider or a provider within the authorizing providers department within the previous 12 mos or has a future within next 30 days. See \"Patient Info\" tab in inbasket, or \"Choose Columns\" in Meds & Orders section of the refill encounter.              Passed - Medication is active on med list        Passed - Patient is age 18 or older           Routing refill request to provider for review/approval because medication did not pass protocol.    Pt has an appointment on 01/25/23    Ghislaine Preciado RN  [facility]   "

## 2023-01-24 ASSESSMENT — ENCOUNTER SYMPTOMS
PALPITATIONS: 0
JOINT SWELLING: 0
CHILLS: 0
ABDOMINAL PAIN: 0
WEAKNESS: 0
FEVER: 0
HEADACHES: 0
NAUSEA: 0
SHORTNESS OF BREATH: 0
HEMATOCHEZIA: 0
DIZZINESS: 0
MYALGIAS: 0
HEARTBURN: 0
SORE THROAT: 0
DIARRHEA: 0
EYE PAIN: 0
DYSURIA: 0
ARTHRALGIAS: 0
CONSTIPATION: 0
FREQUENCY: 0
PARESTHESIAS: 0
NERVOUS/ANXIOUS: 0
HEMATURIA: 0
COUGH: 0

## 2023-01-25 ENCOUNTER — OFFICE VISIT (OUTPATIENT)
Dept: FAMILY MEDICINE | Facility: CLINIC | Age: 53
End: 2023-01-25
Payer: COMMERCIAL

## 2023-01-25 VITALS
DIASTOLIC BLOOD PRESSURE: 66 MMHG | OXYGEN SATURATION: 96 % | WEIGHT: 221.8 LBS | SYSTOLIC BLOOD PRESSURE: 104 MMHG | TEMPERATURE: 97.7 F | BODY MASS INDEX: 28.47 KG/M2 | HEIGHT: 74 IN | RESPIRATION RATE: 16 BRPM | HEART RATE: 76 BPM

## 2023-01-25 DIAGNOSIS — F33.1 MAJOR DEPRESSIVE DISORDER, RECURRENT, MODERATE (H): ICD-10-CM

## 2023-01-25 DIAGNOSIS — I25.10 CORONARY ARTERY DISEASE INVOLVING NATIVE CORONARY ARTERY OF NATIVE HEART WITHOUT ANGINA PECTORIS: ICD-10-CM

## 2023-01-25 DIAGNOSIS — Z00.00 ROUTINE GENERAL MEDICAL EXAMINATION AT A HEALTH CARE FACILITY: Primary | ICD-10-CM

## 2023-01-25 DIAGNOSIS — F41.1 ANXIETY STATE: ICD-10-CM

## 2023-01-25 DIAGNOSIS — K21.9 GASTROESOPHAGEAL REFLUX DISEASE, UNSPECIFIED WHETHER ESOPHAGITIS PRESENT: ICD-10-CM

## 2023-01-25 DIAGNOSIS — E78.5 HYPERLIPIDEMIA LDL GOAL <100: ICD-10-CM

## 2023-01-25 LAB
ALBUMIN SERPL BCG-MCNC: 4.4 G/DL (ref 3.5–5.2)
ALP SERPL-CCNC: 110 U/L (ref 40–129)
ALT SERPL W P-5'-P-CCNC: 30 U/L (ref 10–50)
ANION GAP SERPL CALCULATED.3IONS-SCNC: 12 MMOL/L (ref 7–15)
AST SERPL W P-5'-P-CCNC: 25 U/L (ref 10–50)
BILIRUB SERPL-MCNC: 0.4 MG/DL
BUN SERPL-MCNC: 20.4 MG/DL (ref 6–20)
CALCIUM SERPL-MCNC: 9.5 MG/DL (ref 8.6–10)
CHLORIDE SERPL-SCNC: 106 MMOL/L (ref 98–107)
CHOLEST SERPL-MCNC: 135 MG/DL
CREAT SERPL-MCNC: 1.1 MG/DL (ref 0.67–1.17)
CREAT UR-MCNC: 206 MG/DL
DEPRECATED HCO3 PLAS-SCNC: 23 MMOL/L (ref 22–29)
GFR SERPL CREATININE-BSD FRML MDRD: 80 ML/MIN/1.73M2
GLUCOSE SERPL-MCNC: 105 MG/DL (ref 70–99)
HDLC SERPL-MCNC: 48 MG/DL
LDLC SERPL CALC-MCNC: 70 MG/DL
MICROALBUMIN UR-MCNC: <12 MG/L
MICROALBUMIN/CREAT UR: NORMAL MG/G{CREAT}
NONHDLC SERPL-MCNC: 87 MG/DL
POTASSIUM SERPL-SCNC: 4.2 MMOL/L (ref 3.4–5.3)
PROT SERPL-MCNC: 6.9 G/DL (ref 6.4–8.3)
SODIUM SERPL-SCNC: 141 MMOL/L (ref 136–145)
TRIGL SERPL-MCNC: 84 MG/DL

## 2023-01-25 PROCEDURE — 91313 COVID-19 VACCINE BIVALENT BOOSTER 18+ (MODERNA): CPT | Performed by: FAMILY MEDICINE

## 2023-01-25 PROCEDURE — 36415 COLL VENOUS BLD VENIPUNCTURE: CPT | Performed by: FAMILY MEDICINE

## 2023-01-25 PROCEDURE — 99396 PREV VISIT EST AGE 40-64: CPT | Performed by: FAMILY MEDICINE

## 2023-01-25 PROCEDURE — 80061 LIPID PANEL: CPT | Performed by: FAMILY MEDICINE

## 2023-01-25 PROCEDURE — 0134A COVID-19 VACCINE BIVALENT BOOSTER 18+ (MODERNA): CPT | Performed by: FAMILY MEDICINE

## 2023-01-25 PROCEDURE — 80053 COMPREHEN METABOLIC PANEL: CPT | Performed by: FAMILY MEDICINE

## 2023-01-25 PROCEDURE — 82043 UR ALBUMIN QUANTITATIVE: CPT | Performed by: FAMILY MEDICINE

## 2023-01-25 PROCEDURE — 82570 ASSAY OF URINE CREATININE: CPT | Performed by: FAMILY MEDICINE

## 2023-01-25 RX ORDER — AMLODIPINE BESYLATE 2.5 MG/1
2.5 TABLET ORAL DAILY
Qty: 90 TABLET | Refills: 1 | Status: SHIPPED | OUTPATIENT
Start: 2023-01-25 | End: 2023-07-20

## 2023-01-25 RX ORDER — ATORVASTATIN CALCIUM 40 MG/1
40 TABLET, FILM COATED ORAL DAILY
Qty: 90 TABLET | Refills: 3 | Status: SHIPPED | OUTPATIENT
Start: 2023-01-25 | End: 2024-01-29

## 2023-01-25 ASSESSMENT — ENCOUNTER SYMPTOMS
CHILLS: 0
FEVER: 0
CONSTIPATION: 0
JOINT SWELLING: 0
ABDOMINAL PAIN: 0
DYSURIA: 0
SHORTNESS OF BREATH: 0
COUGH: 0
FREQUENCY: 0
ARTHRALGIAS: 0
EYE PAIN: 0
DIARRHEA: 0
WEAKNESS: 0
HEMATOCHEZIA: 0
PALPITATIONS: 0
HEADACHES: 0
SORE THROAT: 0
PARESTHESIAS: 0
DIZZINESS: 0
HEMATURIA: 0
MYALGIAS: 0
HEARTBURN: 0
NERVOUS/ANXIOUS: 0
NAUSEA: 0

## 2023-01-25 ASSESSMENT — ANXIETY QUESTIONNAIRES
2. NOT BEING ABLE TO STOP OR CONTROL WORRYING: NOT AT ALL
GAD7 TOTAL SCORE: 0
GAD7 TOTAL SCORE: 0
5. BEING SO RESTLESS THAT IT IS HARD TO SIT STILL: NOT AT ALL
7. FEELING AFRAID AS IF SOMETHING AWFUL MIGHT HAPPEN: NOT AT ALL
8. IF YOU CHECKED OFF ANY PROBLEMS, HOW DIFFICULT HAVE THESE MADE IT FOR YOU TO DO YOUR WORK, TAKE CARE OF THINGS AT HOME, OR GET ALONG WITH OTHER PEOPLE?: NOT DIFFICULT AT ALL
IF YOU CHECKED OFF ANY PROBLEMS ON THIS QUESTIONNAIRE, HOW DIFFICULT HAVE THESE PROBLEMS MADE IT FOR YOU TO DO YOUR WORK, TAKE CARE OF THINGS AT HOME, OR GET ALONG WITH OTHER PEOPLE: NOT DIFFICULT AT ALL
6. BECOMING EASILY ANNOYED OR IRRITABLE: NOT AT ALL
7. FEELING AFRAID AS IF SOMETHING AWFUL MIGHT HAPPEN: NOT AT ALL
GAD7 TOTAL SCORE: 0
4. TROUBLE RELAXING: NOT AT ALL
1. FEELING NERVOUS, ANXIOUS, OR ON EDGE: NOT AT ALL
3. WORRYING TOO MUCH ABOUT DIFFERENT THINGS: NOT AT ALL

## 2023-01-25 ASSESSMENT — PATIENT HEALTH QUESTIONNAIRE - PHQ9
10. IF YOU CHECKED OFF ANY PROBLEMS, HOW DIFFICULT HAVE THESE PROBLEMS MADE IT FOR YOU TO DO YOUR WORK, TAKE CARE OF THINGS AT HOME, OR GET ALONG WITH OTHER PEOPLE: NOT DIFFICULT AT ALL
SUM OF ALL RESPONSES TO PHQ QUESTIONS 1-9: 2
SUM OF ALL RESPONSES TO PHQ QUESTIONS 1-9: 2

## 2023-01-25 ASSESSMENT — PAIN SCALES - GENERAL: PAINLEVEL: NO PAIN (0)

## 2023-01-25 NOTE — PROGRESS NOTES
SUBJECTIVE:   CC: Osiel is an 52 year old who presents for preventative health visit.   Patient has been advised of split billing requirements and indicates understanding: Yes  Healthy Habits:     Getting at least 3 servings of Calcium per day:  NO    Bi-annual eye exam:  Yes    Dental care twice a year:  Yes    Sleep apnea or symptoms of sleep apnea:  None    Diet:  Low salt    Frequency of exercise:  None    Taking medications regularly:  Yes    Medication side effects:  Not applicable    PHQ-2 Total Score: 2    Additional concerns today:  No    Wt has gone up.    Hurt his back in Oct, raking leaves time. Then the holidays...  Hasn't been able to get back into exercise- loves to run, but wary with the back and the ice now.  Doesn't like the gym.  Went to PT a couple times, learned good exercises for his back.  But if he just stands working on dishes, things at home, back gets achy.  Better if he keeps doing the exercises.      2017- lost wt with no sugar/carbs  Grilled chicken and fruits/vegs, all the good things.  Still does it occasionally.  Realized seeing his wt 1/17 he needed to do something.  Needs to get back it.    MDD-   Stopped mood meds in ~3/22 as sx's were so stable for awhile.  Doing okay, manageable, even though [place] is tough in general.  PHQ9's have been '0', now '2'.  Not never, just couple days here and there, feels a bit down.  Sx's only usually last a day or two depending.  Still a bit more emotional, thought that might improve off meds, but hasn't changed.      Hyperlipidemia Follow-Up    Are you regularly taking any medication or supplement to lower your cholesterol?   Yes- atorvastatin 40mg    Are you having muscle aches or other side effects that you think could be caused by your cholesterol lowering medication?  No    Hypertension Follow-up    Do you check your blood pressure regularly outside of the clinic? No     Are you following a low salt diet? No    Are your blood pressures ever more  than 140 on the top number (systolic) OR more   than 90 on the bottom number (diastolic), for example 140/90? No      Today's PHQ-2 Score:   PHQ-2 ( 1999 [employer]) 1/24/2023   Q1: Little interest or pleasure in doing things 1   Q2: Feeling down, depressed or hopeless 1   PHQ-2 Score 2   PHQ-2 Total Score (12-17 Years)- Positive if 3 or more points; Administer PHQ-A if positive -   Q1: Little interest or pleasure in doing things Several days   Q2: Feeling down, depressed or hopeless Several days   PHQ-2 Score 2       Have you ever done Advance Care Planning? (For example, a Health Directive, POLST, or a discussion with a medical provider or your loved ones about your wishes): No, advance care planning information given to patient to review.  Patient plans to discuss their wishes with loved ones or provider.      Social History     Tobacco Use     Smoking status: Former     Packs/day: 0.25     Years: 20.00     Pack years: 5.00     Types: Cigarettes     Quit date: 7/1/2012     Years since quitting: 10.5     Smokeless tobacco: Never     Tobacco comments:     stopped after chest pain ER visit in 7/12   Substance Use Topics     Alcohol use: Yes     Comment: 2-3 drinks week     If you drink alcohol do you typically have >3 drinks per day or >7 drinks per week? No    No flowsheet data found.    Last PSA:   PSA   Date Value Ref Range Status   11/17/2020 0.97 0 - 4 ug/L Final     Comment:     Assay Method:  Chemiluminescence using Siemens Vista analyzer       Reviewed orders with patient. Reviewed health maintenance and updated orders accordingly - Yes    Lab work is in process  Labs reviewed in EPIC  BP Readings from Last 3 Encounters:   01/25/23 104/66   04/22/22 114/71   03/04/22 117/74    Wt Readings from Last 3 Encounters:   01/25/23 100.6 kg (221 lb 12.8 oz)   04/22/22 95.3 kg (210 lb)   03/04/22 96.8 kg (213 lb 4.8 oz)                  Patient Active Problem List   Diagnosis     Allergic rhinitis due to other allergen      Anxiety state     Major depressive disorder, recurrent, moderate (H)     Hyperlipidemia LDL goal <100     GERD (gastroesophageal reflux disease)     CAD (coronary artery disease)     Nonspecific abnormal electrocardiogram (ECG) (EKG)     Snoring     Past Surgical History:   Procedure Laterality Date     CARDIAC ECHO ([facility])  7/12    [facility], borderline concentric LVH, EF 55%     COLONOSCOPY N/A 4/22/2022    Procedure: COLONOSCOPY, FLEXIBLE, WITH LESION REMOVAL USING SNARE;  Surgeon: Ti Villa MD;  Location:  GI     NO HISTORY OF SURGERY         Social History     Tobacco Use     Smoking status: Former     Packs/day: 0.25     Years: 20.00     Pack years: 5.00     Types: Cigarettes     Quit date: 7/1/2012     Years since quitting: 10.5     Smokeless tobacco: Never     Tobacco comments:     stopped after chest pain ER visit in 7/12   Substance Use Topics     Alcohol use: Yes     Comment: 2-3 drinks week     Family History   Problem Relation Age of Onset     Depression Mother         on meds     Gynecology Mother         Hysterectomy     Heart Disease Father         MI around age 60     Allergies Father      Lipids Father      Prostate Cancer Father      Genitourinary Problems Maternal Grandfather         Unsure of what     Respiratory Maternal Grandfather         Breathing problems from farming     Heart Disease Paternal Grandfather         MI around 65         Current Outpatient Medications   Medication Sig Dispense Refill     amLODIPine (NORVASC) 2.5 MG tablet Take 1 tablet (2.5 mg) by mouth daily 90 tablet 1     aspirin 81 MG tablet Take 1 tablet by mouth daily. 90 tablet 3     atorvastatin (LIPITOR) 40 MG tablet Take 1 tablet (40 mg) by mouth daily 90 tablet 3     cholecalciferol (VITAMIN D3) 1000 UNIT tablet Take 2 tablets (2,000 Units) by mouth daily 100 tablet 3     cyclobenzaprine (FLEXERIL) 10 MG tablet Take 1 tablet (10 mg) by mouth 3 times daily as needed for muscle spasms 30 tablet 0  "    esomeprazole (NEXIUM) 20 MG DR capsule Take 1 capsule (20 mg) by mouth every morning (before breakfast) Take 30-60 minutes before eating. 90 capsule 3     Allergies   Allergen Reactions     Dust Mite Extract      Recent Labs   Lab Test 09/17/21  0824 11/17/20  0831 11/06/20  1015 04/24/19  0854   LDL 85 101*  --  73   HDL 56 60  --  60   TRIG 97 138  --  104   CR 1.14  --  1.06 1.10   GFRESTIMATED 74  --  80 78   GFRESTBLACK  --   --  >90 90   POTASSIUM 3.9  --  3.8 4.5        Reviewed and updated as needed this visit by clinical staff   Tobacco  Allergies  Meds  Problems  Med Hx  Surg Hx  Fam Hx          Reviewed and updated as needed this visit by Provider   Tobacco  Allergies  Meds  Problems  Med Hx  Surg Hx  Fam Hx             Review of Systems   Constitutional: Negative for chills and fever.   HENT: Negative for congestion, ear pain, hearing loss and sore throat.    Eyes: Negative for pain and visual disturbance.   Respiratory: Negative for cough and shortness of breath.    Cardiovascular: Negative for chest pain, palpitations and peripheral edema.   Gastrointestinal: Negative for abdominal pain, constipation, diarrhea, heartburn, hematochezia and nausea.   Genitourinary: Negative for dysuria, frequency, genital sores, hematuria, impotence, penile discharge and urgency.   Musculoskeletal: Negative for arthralgias, joint swelling and myalgias.   Skin: Negative for rash.   Neurological: Negative for dizziness, weakness, headaches and paresthesias.   Psychiatric/Behavioral: Negative for mood changes. The patient is not nervous/anxious.          OBJECTIVE:   /66 (BP Location: Left arm, Patient Position: Sitting, Cuff Size: Adult Large)   Pulse 76   Temp 97.7  F (36.5  C) (Temporal)   Resp 16   Ht 1.867 m (6' 1.5\")   Wt 100.6 kg (221 lb 12.8 oz)   SpO2 96%   BMI 28.87 kg/m      Physical Exam  GENERAL: healthy, alert and no distress  EYES: Eyes grossly normal to inspection, PERRL and " conjunctivae and sclerae normal  HENT: ear canals and TM's normal, nose and mouth without ulcers or lesions  NECK: no adenopathy, no asymmetry, masses, or scars and thyroid normal to palpation  RESP: lungs clear to auscultation - no rales, rhonchi or wheezes  CV: regular rate and rhythm, normal S1 S2, no S3 or S4, no murmur, click or rub, no peripheral edema and peripheral pulses strong  ABDOMEN: soft, nontender, no hepatosplenomegaly, no masses and bowel sounds normal  MS: no gross musculoskeletal defects noted, no edema  SKIN: no suspicious lesions or rashes  NEURO: Normal strength and tone, mentation intact and speech normal  PSYCH: mentation appears normal, affect normal/bright    Diagnostic Test Results:  Labs reviewed in Epic    ASSESSMENT/PLAN:       ICD-10-CM    1. Routine general medical examination at a health care facility  Z00.00       2. Coronary artery disease involving native coronary artery of native heart without angina pectoris  I25.10 amLODIPine (NORVASC) 2.5 MG tablet     Lipid panel reflex to direct LDL Fasting     Comprehensive metabolic panel (BMP + Alb, Alk Phos, ALT, AST, Total. Bili, TP)     Albumin Random Urine Quantitative with Creat Ratio      3. Hyperlipidemia LDL goal <100  E78.5 atorvastatin (LIPITOR) 40 MG tablet     Lipid panel reflex to direct LDL Fasting     Comprehensive metabolic panel (BMP + Alb, Alk Phos, ALT, AST, Total. Bili, TP)     Albumin Random Urine Quantitative with Creat Ratio      4. Major depressive disorder, recurrent, moderate (H)  F33.1       5. Anxiety state  F41.1       6. Gastroesophageal reflux disease, unspecified whether esophagitis present  K21.9 esomeprazole (NEXIUM) 20 MG DR capsule        CPE- Discussed diet, calcium/D and exercise.  Eye and dental care UTD or recommended f/u.  COVID-19 immunizations needed today.  See orders below for tests ordered and screening needed.     CAD/Lipids- Wt has gone up again, likely due to back injury in 10/22 and  "holidays.  Wants to get back to running, but wary of the ice/conditions.  May look into streaming exercise programs and run when safe.  BP remains great.  Labs as above, refills sent.  Cont q6mo follow-up.    MDD/anxiety- sx's stable/mostly good despite stopping mood meds, wellbutrin last in ~3/22.  Winter/Jan harder, but still okay.  Couple days max usually of feeling down, then better. Increasing exercise will likely help if able to start/maintain.  Continuing Vit D, bright lights as well.    GERD- stable sx's on nexium, hasn't done trial of pepcid, wants to hold off on trial for now.    Return in about 6 months (around 7/25/2023) for CAD, GERD, mood, BP check.      Patient has been advised of split billing requirements and indicates understanding: Yes      COUNSELING:   Reviewed preventive health counseling, as reflected in patient instructions      BMI:   Estimated body mass index is 28.87 kg/m  as calculated from the following:    Height as of this encounter: 1.867 m (6' 1.5\").    Weight as of this encounter: 100.6 kg (221 lb 12.8 oz).   Weight management plan: Discussed healthy diet and exercise guidelines      He reports that he quit smoking about 10 years ago. His smoking use included cigarettes. He has a 5.00 pack-year smoking history. He has never used smokeless tobacco.        Danika Hargrove MD  [facility]  Answers for HPI/ROS submitted by the patient on 1/25/2023  If you checked off any problems, how difficult have these problems made it for you to do your work, take care of things at home, or get along with other people?: Not difficult at all  PHQ9 TOTAL SCORE: 2  YOGESH 7 TOTAL SCORE: 0      "

## 2023-01-25 NOTE — PATIENT INSTRUCTIONS
Recommend getting shingrix at pharmacy- 6 months apart.        Preventive Health Recommendations  Male Ages 50 - 64    Yearly exam:             See your health care provider every year in order to  o   Review health changes.   o   Discuss preventive care.    o   Review your medicines if your doctor has prescribed any.   Have a cholesterol test every 5 years, or more frequently if you are at risk for high cholesterol/heart disease.   Have a diabetes test (fasting glucose) every three years. If you are at risk for diabetes, you should have this test more often.   Have a colonoscopy at age 50, or have a yearly FIT test (stool test). These exams will check for colon cancer.    Talk with your health care provider about whether or not a prostate cancer screening test (PSA) is right for you.  You should be tested each year for STDs (sexually transmitted diseases), if you re at risk.     Shots: Get a flu shot each year. Get a tetanus shot every 10 years.     Nutrition:  Eat at least 5 servings of fruits and vegetables daily.   Eat whole-grain bread, whole-wheat pasta and brown rice instead of white grains and rice.   Get adequate Calcium and Vitamin D.     Lifestyle  Exercise for at least 150 minutes a week (30 minutes a day, 5 days a week). This will help you control your weight and prevent disease.   Limit alcohol to one drink per day.   No smoking.   Wear sunscreen to prevent skin cancer.   See your dentist every six months for an exam and cleaning.   See your eye doctor every 1 to 2 years.

## 2023-01-27 NOTE — RESULT ENCOUNTER NOTE
-Your cholesterol panel looks great with a low LDL (the bad cholesterol) and a decent HDL (the good cholesterol).   -Your CMP (which includes electrolyte levels, blood sugar levels, and kidney and liver function tests) good except for the just slightly elevated BUN (can be up with dehydration), and the slightly elevated glucose. The blood glucose (blood sugar) is a screening test for diabetes, and came back in the 'pre-diabetic' range (100-125).  This means that you do not have diabetes yet, but could be on the path to develop it without lifestyle changes. Cutting back on sweets, sugars, and simple carbohydrates can help, along with losing a bit of weight can help.  -Your microalbumin level (which is the urine test that can signal signs of early chronic kidney disease if elevated to >30) is low which is good.    Please let me know if you have any questions.  Best,   Rai Hargrove MD

## 2023-04-12 NOTE — PROGRESS NOTES
Discharge Note    Progress reporting period is from last daily note on Nov 8, 2022.  PT evaluation on 11/3/2022.  Rob failed to follow up and current status is unknown.  Please see information below for last relevant information on current status.  Patient seen for 2 visits.    SUBJECTIVE  Subjective changes noted by patient:  Patient returns to clinic with pain now located in the center of his back along with overall reduced PL's.  .  Current pain level is 2/10.     Previous pain level was  5/10.   Changes in function:  Yes (See Goal flowsheet attached for changes in current functional level)  Adverse reaction to treatment or activity: None    OBJECTIVE  Changes noted in objective findings: No significant improvement in lumbar AROM: flexion no loss, and extension max loss, butpt visibly more comfortable moving into extension. Progressed force with press-ups(with hands moved closer to shoulders).     ASSESSMENT/PLAN  Diagnosis: Lumbar back spasms   Updated problem list and treatment plan:   Pain - HEP  Decreased ROM/flexibility - HEP  Decreased function - HEP  STG/LTGs have been met or progress has been made towards goals:  Yes, please see goal flowsheet for most current information  Assessment of Progress: current status is unknown.    Last current status:     Self Management Plans:  HEP  I have re-evaluated this patient and find that the nature, scope, duration and intensity of the therapy is appropriate for the medical condition of the patient.  Rob continues to require the following intervention to meet STG and LTG's:  HEP.    Recommendations:  Discharge with current home program.  Patient to follow up with MD as needed.    Please refer to the daily flowsheet for treatment today, total treatment time and time spent performing 1:1 timed codes.

## 2023-11-21 DIAGNOSIS — I25.10 CORONARY ARTERY DISEASE INVOLVING NATIVE CORONARY ARTERY OF NATIVE HEART WITHOUT ANGINA PECTORIS: ICD-10-CM

## 2023-11-21 DIAGNOSIS — E78.5 HYPERLIPIDEMIA LDL GOAL <100: Primary | ICD-10-CM

## 2023-11-22 RX ORDER — AMLODIPINE BESYLATE 2.5 MG/1
TABLET ORAL
Qty: 90 TABLET | Refills: 0 | Status: SHIPPED | OUTPATIENT
Start: 2023-11-22 | End: 2024-01-30

## 2023-11-22 NOTE — TELEPHONE ENCOUNTER
Patient is scheduled for physical and fasting labs - different days.     CW,   Can you put in lab orders?  Thanks!  Rox WOODS

## 2023-11-22 NOTE — TELEPHONE ENCOUNTER
Pt way overdue for 6mo follow-up appt - no appt scheduled.  At this point, given availability, pls have him schedule his yearly in late 1/24, with fasting labs at appt or prior if he prefers (then send msg back to me to order).  #90 sent for now.  Thank you!  CW

## 2023-11-22 NOTE — TELEPHONE ENCOUNTER
Called patient and lvm to call clinic back at [phone].   When patient calls back, please see CW's note below for reference.    Rox ALMEIDA  TC

## 2024-01-26 ENCOUNTER — LAB (OUTPATIENT)
Dept: LAB | Facility: CLINIC | Age: 54
End: 2024-01-26
Payer: COMMERCIAL

## 2024-01-26 DIAGNOSIS — E78.5 HYPERLIPIDEMIA LDL GOAL <100: ICD-10-CM

## 2024-01-26 DIAGNOSIS — I25.10 CORONARY ARTERY DISEASE INVOLVING NATIVE CORONARY ARTERY OF NATIVE HEART WITHOUT ANGINA PECTORIS: ICD-10-CM

## 2024-01-26 LAB
ALBUMIN SERPL BCG-MCNC: 4.3 G/DL (ref 3.5–5.2)
ALP SERPL-CCNC: 106 U/L (ref 40–150)
ALT SERPL W P-5'-P-CCNC: 34 U/L (ref 0–70)
ANION GAP SERPL CALCULATED.3IONS-SCNC: 10 MMOL/L (ref 7–15)
AST SERPL W P-5'-P-CCNC: 23 U/L (ref 0–45)
BILIRUB SERPL-MCNC: 1 MG/DL
BUN SERPL-MCNC: 19.5 MG/DL (ref 6–20)
CALCIUM SERPL-MCNC: 9.5 MG/DL (ref 8.6–10)
CHLORIDE SERPL-SCNC: 107 MMOL/L (ref 98–107)
CHOLEST SERPL-MCNC: 147 MG/DL
CREAT SERPL-MCNC: 1.03 MG/DL (ref 0.67–1.17)
CREAT UR-MCNC: 303 MG/DL
DEPRECATED HCO3 PLAS-SCNC: 23 MMOL/L (ref 22–29)
EGFRCR SERPLBLD CKD-EPI 2021: 86 ML/MIN/1.73M2
FASTING STATUS PATIENT QL REPORTED: YES
GLUCOSE SERPL-MCNC: 100 MG/DL (ref 70–99)
HDLC SERPL-MCNC: 42 MG/DL
LDLC SERPL CALC-MCNC: 81 MG/DL
MICROALBUMIN UR-MCNC: <12 MG/L
MICROALBUMIN/CREAT UR: NORMAL MG/G{CREAT}
NONHDLC SERPL-MCNC: 105 MG/DL
POTASSIUM SERPL-SCNC: 3.8 MMOL/L (ref 3.4–5.3)
PROT SERPL-MCNC: 6.9 G/DL (ref 6.4–8.3)
SODIUM SERPL-SCNC: 140 MMOL/L (ref 135–145)
TRIGL SERPL-MCNC: 122 MG/DL

## 2024-01-26 PROCEDURE — 82570 ASSAY OF URINE CREATININE: CPT

## 2024-01-26 PROCEDURE — 82043 UR ALBUMIN QUANTITATIVE: CPT

## 2024-01-26 PROCEDURE — 36415 COLL VENOUS BLD VENIPUNCTURE: CPT

## 2024-01-26 PROCEDURE — 80053 COMPREHEN METABOLIC PANEL: CPT

## 2024-01-26 PROCEDURE — 80061 LIPID PANEL: CPT

## 2024-01-29 ASSESSMENT — ENCOUNTER SYMPTOMS
HEMATURIA: 0
DIZZINESS: 0
WEAKNESS: 0
JOINT SWELLING: 0
SHORTNESS OF BREATH: 0
NAUSEA: 0
CHILLS: 0
PALPITATIONS: 0
ARTHRALGIAS: 0
CONSTIPATION: 0
FEVER: 0
HEMATOCHEZIA: 0
ABDOMINAL PAIN: 0
NERVOUS/ANXIOUS: 0
COUGH: 0
PARESTHESIAS: 0
FREQUENCY: 0
EYE PAIN: 0
HEARTBURN: 0
SORE THROAT: 0
DIARRHEA: 0
MYALGIAS: 0
DYSURIA: 0
HEADACHES: 0

## 2024-01-29 ASSESSMENT — PATIENT HEALTH QUESTIONNAIRE - PHQ9
10. IF YOU CHECKED OFF ANY PROBLEMS, HOW DIFFICULT HAVE THESE PROBLEMS MADE IT FOR YOU TO DO YOUR WORK, TAKE CARE OF THINGS AT HOME, OR GET ALONG WITH OTHER PEOPLE: NOT DIFFICULT AT ALL
SUM OF ALL RESPONSES TO PHQ QUESTIONS 1-9: 4
SUM OF ALL RESPONSES TO PHQ QUESTIONS 1-9: 4

## 2024-01-30 ENCOUNTER — OFFICE VISIT (OUTPATIENT)
Dept: FAMILY MEDICINE | Facility: CLINIC | Age: 54
End: 2024-01-30
Payer: COMMERCIAL

## 2024-01-30 VITALS
BODY MASS INDEX: 29.34 KG/M2 | RESPIRATION RATE: 16 BRPM | DIASTOLIC BLOOD PRESSURE: 76 MMHG | SYSTOLIC BLOOD PRESSURE: 115 MMHG | TEMPERATURE: 97.6 F | WEIGHT: 221.4 LBS | OXYGEN SATURATION: 94 % | HEIGHT: 73 IN | HEART RATE: 66 BPM

## 2024-01-30 DIAGNOSIS — F33.1 MAJOR DEPRESSIVE DISORDER, RECURRENT, MODERATE (H): ICD-10-CM

## 2024-01-30 DIAGNOSIS — R73.09 ELEVATED GLUCOSE: ICD-10-CM

## 2024-01-30 DIAGNOSIS — M54.50 CHRONIC BILATERAL LOW BACK PAIN WITHOUT SCIATICA: ICD-10-CM

## 2024-01-30 DIAGNOSIS — Z00.00 ROUTINE GENERAL MEDICAL EXAMINATION AT A HEALTH CARE FACILITY: Primary | ICD-10-CM

## 2024-01-30 DIAGNOSIS — E78.5 HYPERLIPIDEMIA LDL GOAL <100: ICD-10-CM

## 2024-01-30 DIAGNOSIS — G89.29 CHRONIC BILATERAL LOW BACK PAIN WITHOUT SCIATICA: ICD-10-CM

## 2024-01-30 DIAGNOSIS — K21.9 GASTROESOPHAGEAL REFLUX DISEASE, UNSPECIFIED WHETHER ESOPHAGITIS PRESENT: ICD-10-CM

## 2024-01-30 DIAGNOSIS — I25.10 CORONARY ARTERY DISEASE INVOLVING NATIVE CORONARY ARTERY OF NATIVE HEART WITHOUT ANGINA PECTORIS: ICD-10-CM

## 2024-01-30 PROCEDURE — 90686 IIV4 VACC NO PRSV 0.5 ML IM: CPT | Performed by: FAMILY MEDICINE

## 2024-01-30 PROCEDURE — 90471 IMMUNIZATION ADMIN: CPT | Performed by: FAMILY MEDICINE

## 2024-01-30 PROCEDURE — 99214 OFFICE O/P EST MOD 30 MIN: CPT | Mod: 25 | Performed by: FAMILY MEDICINE

## 2024-01-30 PROCEDURE — 99396 PREV VISIT EST AGE 40-64: CPT | Mod: 25 | Performed by: FAMILY MEDICINE

## 2024-01-30 PROCEDURE — 96127 BRIEF EMOTIONAL/BEHAV ASSMT: CPT | Performed by: FAMILY MEDICINE

## 2024-01-30 RX ORDER — ATORVASTATIN CALCIUM 40 MG/1
40 TABLET, FILM COATED ORAL DAILY
Qty: 90 TABLET | Refills: 3 | Status: SHIPPED | OUTPATIENT
Start: 2024-01-30

## 2024-01-30 RX ORDER — AMLODIPINE BESYLATE 2.5 MG/1
2.5 TABLET ORAL DAILY
Qty: 90 TABLET | Refills: 3 | Status: SHIPPED | OUTPATIENT
Start: 2024-01-30

## 2024-01-30 ASSESSMENT — ENCOUNTER SYMPTOMS
ABDOMINAL PAIN: 0
NERVOUS/ANXIOUS: 0
CONSTIPATION: 0
PALPITATIONS: 0
WEAKNESS: 0
DIZZINESS: 0
FEVER: 0
ARTHRALGIAS: 0
MYALGIAS: 0
EYE PAIN: 0
JOINT SWELLING: 0
FREQUENCY: 0
HEADACHES: 0
SHORTNESS OF BREATH: 0
SORE THROAT: 0
NAUSEA: 0
HEMATURIA: 0
DYSURIA: 0
DIARRHEA: 0
COUGH: 0
CHILLS: 0

## 2024-01-30 ASSESSMENT — PAIN SCALES - GENERAL: PAINLEVEL: NO PAIN (0)

## 2024-01-30 NOTE — PROGRESS NOTES
Preventive Care Visit  [facility]  Danika Hargrove MD, Family Medicine  Jan 30, 2024      SUBJECTIVE:   Osiel is a 53 year old, presenting for the following:  Physical        1/30/2024     6:49 AM   Additional Questions   Roomed by Shelley     Healthy Habits:     Getting at least 3 servings of Calcium per day:  Yes    Bi-annual eye exam:  Yes    Dental care twice a year:  Yes    Sleep apnea or symptoms of sleep apnea:  None    Diet:  Low salt    Frequency of exercise:  None    Taking medications regularly:  Yes    Medication side effects:  None    Additional concerns today:  No    Vaccines- interested in flu today, but would like to hold off on COVID due to timing during the week- will try and get at pharmacy.  Will also try and do shingrix at pharmacy.    Back- has been doing okay, no major blow outs.  Happened initially just picking up leaves, over a year ago (Fall).  Doing stretches when he's doing work now.  Exercise- not much at all, walking some (less in winter).  In past, used to run some.    Labs- lipids look great.  Glucose- slightly elevated the last few yrs.  Doesn't drink soda, but does snack on sweets at work.    Mood- doing a SAD light, seems to help.  Hard in Jan, some days that are a bit of a struggle, but overall it's fine.      Answers submitted by the patient for this visit:  Patient Health Questionnaire (Submitted on 1/29/2024)  If you checked off any problems, how difficult have these problems made it for you to do your work, take care of things at home, or get along with other people?: Not difficult at all  PHQ9 TOTAL SCORE: 4  Annual Preventive Visit (Submitted on 1/29/2024)  Chief Complaint: Annual Exam:  Blood in stool: No  heartburn: No  peripheral edema: No  mood changes: No  Skin sensation changes: No  impotence: No    Today's PHQ-9 Score:       1/29/2024    10:07 PM   PHQ-9 SCORE   PHQ-9 Total Score MyChart 4 (Minimal depression)   PHQ-9 Total Score 4  Procedure Consent Wording: Right knee arthroscopic partial medial lateral meniscectomies with other work as indicated  CPT:[id number] - Knee Arthroscopy w/ Meniscectomy Partial Medial and Lateral right; DX: 836-Medial meniscal tear/836.1-Lateral meniscal tear    Patient's preferred date: [facility]  Hospital: [facility] or [facility]  Anesthesia: General  Length of Stay: Day Surgery / Outpatient  Duration of Procedure: 30 min  Equipment: None  Need OR Surgical Assist:   No  Pre-Op Done By: Not Needed    Orders:   - PreOp Labs Preferred: OR Anesthesia Protocol   CBC, BMP, UPT, EKG       If arthroplasty: Needs preop MRSA screen & [facility]  - Shoulder arthroplasty & arthroscopic RCR: Need benzoyl peroxide sheet sent with packet   - Antibiotics: 2g ancef    - Pre-op visit with Surgeon: No  - PO visit scheduled Day #: 10-14 days post-op    - PO Equip:  No      Will need crutches post-op: Yes    If yes, make sure patient brings to surgery     - PreHab visit with PT/OT: No  - Pre-Arrange/Order for PO Rehab:  No             Social History     Tobacco Use    Smoking status: Former     Packs/day: 0.25     Years: 20.00     Additional pack years: 0.00     Total pack years: 5.00     Types: Cigarettes     Quit date: 2012     Years since quittin.5    Smokeless tobacco: Never    Tobacco comments:     stopped after chest pain ER visit in    Substance Use Topics    Alcohol use: Yes     Comment: 2-3 drinks week             2024    10:10 PM   Alcohol Use   Prescreen: >3 drinks/day or >7 drinks/week? No         Last PSA:   PSA   Date Value Ref Range Status   2020 0.97 0 - 4 ug/L Final     Comment:     Assay Method:  Chemiluminescence using Siemens Vista analyzer       Reviewed orders with patient. Reviewed health maintenance and updated orders accordingly - Yes      Lab work is in process  Labs reviewed in EPIC    Reviewed and updated as needed this visit by clinical staff   Tobacco  Allergies  Meds  Problems  Med Hx  Surg Hx  Fam Hx          Reviewed and updated as needed this visit by Provider   Tobacco  Allergies  Meds  Problems  Med Hx  Surg Hx  Fam Hx            Review of Systems   Constitutional:  Negative for chills and fever.   HENT:  Negative for congestion, ear pain, hearing loss and sore throat.    Eyes:  Negative for pain and visual disturbance.   Respiratory:  Negative for cough and shortness of breath.    Cardiovascular:  Negative for chest pain and palpitations.   Gastrointestinal:  Negative for abdominal pain, constipation, diarrhea and nausea.   Genitourinary:  Negative for dysuria, frequency, genital sores, hematuria, penile discharge and urgency.   Musculoskeletal:  Negative for arthralgias, joint swelling and myalgias.   Skin:  Negative for rash.   Neurological:  Negative for dizziness, weakness and headaches.   Psychiatric/Behavioral:  The patient is not nervous/anxious.          OBJECTIVE:   /76 (BP Location: Left arm, Patient Position: Sitting, Cuff Size: Adult Large)    "Pulse 66   Temp 97.6  F (36.4  C) (Temporal)   Resp 16   Ht 1.859 m (6' 1.2\")   Wt 100.4 kg (221 lb 6.4 oz)   SpO2 94%   BMI 29.05 kg/m     Estimated body mass index is 29.05 kg/m  as calculated from the following:    Height as of this encounter: 1.859 m (6' 1.2\").    Weight as of this encounter: 100.4 kg (221 lb 6.4 oz).  Physical Exam  GENERAL: alert and no distress  EYES: Eyes grossly normal to inspection, PERRL and conjunctivae and sclerae normal  HENT: ear canals and TM's normal, nose and mouth without ulcers or lesions  NECK: no adenopathy, no asymmetry, masses, or scars  RESP: lungs clear to auscultation - no rales, rhonchi or wheezes  CV: regular rate and rhythm, normal S1 S2, no S3 or S4, no murmur, click or rub, no peripheral edema  ABDOMEN: soft, nontender, no hepatosplenomegaly, no masses and bowel sounds normal  MS: no gross musculoskeletal defects noted, no edema  SKIN: no suspicious lesions or rashes  NEURO: Normal strength and tone, mentation intact and speech normal  PSYCH: mentation appears normal, affect normal/bright  LYMPH: no cervical, supraclavicular, axillary, or inguinal adenopathy    Diagnostic Test Results:  Labs reviewed in Epic    ASSESSMENT/PLAN:   Routine general medical examination at a health care facility  Reviewed chronic issues and medications/supplements.   Discussed healthy habits, eye/dental care, healthcare maintenance issues, including cancer screenings (colonoscopies, PSA), relevant immunizations, and cardiac risk factor screenings such as for cholesterol, HTN, and DM.  See orders for tests and screening needed.    Flu vaccine immunizations needed today.  Will try and do COVID and shingrix at pharmacy.  **Did not get chance to discuss PSA - put in HM to discuss at next visit.   PRIMARY CARE FOLLOW-UP SCHEDULING; Future  - INFLUENZA VACCINE >6 MONTHS (AFLURIA/FLUZONE)    Elevated glucose  Discussed slightly elevated glucose, pre-DM range for last few yrs.  Will try and " do less sugar snacks at work, bit less carbs, more exercise.  - Hemoglobin A1c; Future  - Comprehensive metabolic panel (BMP + Alb, Alk Phos, ALT, AST, Total. Bili, TP); Future    3. Chronic bilateral low back pain without sciatica  Major 'blow-out' last fall, over a year ago, still tentative now, wt still up.  Will see if he can get back into running, discussed strengthening.  Msg if interested in PT for assistance.    4. Coronary artery disease involving native coronary artery of native heart without angina pectoris  Amlodpine more for cardiac protection, BPs have been good, so okay for yearly follow-up (not q6mo).  Refills sent.  **Discussed KATHERINE screening recs, he declines for now but will consider.  STOP-BANG 3-4, intermediate risk.  - amLODIPine (NORVASC) 2.5 MG tablet; Take 1 tablet (2.5 mg) by mouth daily  Dispense: 90 tablet; Refill: 3  - Albumin Random Urine Quantitative with Creat Ratio; Future    5. Hyperlipidemia LDL goal <100  LDL looks good at 81, though HDL trending down- discussed exercise as above.  - atorvastatin (LIPITOR) 40 MG tablet; Take 1 tablet (40 mg) by mouth daily  Dispense: 90 tablet; Refill: 3  - Lipid panel reflex to direct LDL Fasting; Future  - Comprehensive metabolic panel (BMP + Alb, Alk Phos, ALT, AST, Total. Bili, TP); Future    6. Major depressive disorder, recurrent, moderate (H)  Stable sx's off meds, January harder, but doing SAD lights and Vit D.  Will rtc if worsening sx's.  Exercise should also help.    7. Gastroesophageal reflux disease, unspecified whether esophagitis present  Continues on daily nexium.  - esomeprazole (NEXIUM) 20 MG DR capsule; Take 1 capsule (20 mg) by mouth every morning (before breakfast) Take 30-60 minutes before eating.  Dispense: 90 capsule; Refill: 3       Patient has been advised of split billing requirements and indicates understanding: Yes      Counseling  Reviewed preventive health counseling, as reflected in patient instructions        He  reports that he quit smoking about 11 years ago. His smoking use included cigarettes. He has a 5 pack-year smoking history. He has never used smokeless tobacco.              Signed Electronically by: Danika Hargrove MD

## 2024-03-02 ENCOUNTER — MYC REFILL (OUTPATIENT)
Dept: FAMILY MEDICINE | Facility: CLINIC | Age: 54
End: 2024-03-02
Payer: COMMERCIAL

## 2024-03-02 DIAGNOSIS — E78.5 HYPERLIPIDEMIA LDL GOAL <100: ICD-10-CM

## 2024-03-02 DIAGNOSIS — K21.9 GASTROESOPHAGEAL REFLUX DISEASE, UNSPECIFIED WHETHER ESOPHAGITIS PRESENT: ICD-10-CM

## 2024-03-04 RX ORDER — ATORVASTATIN CALCIUM 40 MG/1
40 TABLET, FILM COATED ORAL DAILY
Qty: 90 TABLET | Refills: 3 | OUTPATIENT
Start: 2024-03-04

## 2024-12-12 ENCOUNTER — TELEPHONE (OUTPATIENT)
Dept: FAMILY MEDICINE | Facility: CLINIC | Age: 54
End: 2024-12-12
Payer: COMMERCIAL

## 2024-12-12 NOTE — TELEPHONE ENCOUNTER
Message left on vm to callback to schedule or via [employer].    Thanks  Marion Dockery  TC

## 2024-12-12 NOTE — TELEPHONE ENCOUNTER
Order/Referral Request    Who is requesting: patient     Orders being requested: repeat of liver lab work that patient had done for disability insurance     Reason service is needed/diagnosis: follow up     When are orders needed by: 1/1/2025     Has this been discussed with Provider: No    Does patient have a preference on a Group/Provider/Facility? NA     Does patient have an appointment scheduled?: Yes: video visit on 1/10/2025     Where to send orders: Place orders within Epic    Could we send this information to you in [facility] or would you prefer to receive a phone call?:   Patient would prefer a phone call   Okay to leave a detailed message?: Yes at Home number on file [phone] (home)

## 2024-12-31 ENCOUNTER — PATIENT OUTREACH (OUTPATIENT)
Dept: CARE COORDINATION | Facility: CLINIC | Age: 54
End: 2024-12-31
Payer: COMMERCIAL

## 2025-02-19 DIAGNOSIS — K21.9 GASTROESOPHAGEAL REFLUX DISEASE, UNSPECIFIED WHETHER ESOPHAGITIS PRESENT: ICD-10-CM

## 2025-04-03 ENCOUNTER — TELEPHONE (OUTPATIENT)
Dept: FAMILY MEDICINE | Facility: CLINIC | Age: 55
End: 2025-04-03
Payer: COMMERCIAL

## 2025-04-03 DIAGNOSIS — E78.5 HYPERLIPIDEMIA LDL GOAL <100: ICD-10-CM

## 2025-04-03 DIAGNOSIS — R73.09 ELEVATED GLUCOSE: ICD-10-CM

## 2025-04-03 DIAGNOSIS — I25.10 CORONARY ARTERY DISEASE INVOLVING NATIVE CORONARY ARTERY OF NATIVE HEART WITHOUT ANGINA PECTORIS: ICD-10-CM

## 2025-04-03 DIAGNOSIS — Z00.00 ROUTINE GENERAL MEDICAL EXAMINATION AT A HEALTH CARE FACILITY: Primary | ICD-10-CM

## 2025-04-03 NOTE — TELEPHONE ENCOUNTER
CW,   Patient plans to complete labs tomorrow 4/4/25  Prior to physical 4/11/25  Reviewed labs currently on file with patient - see VV 01/2025  Pended additional yearly labs if recommended  Patient plans to be fasting  Halina Calix RN

## 2025-05-17 DIAGNOSIS — K21.9 GASTROESOPHAGEAL REFLUX DISEASE, UNSPECIFIED WHETHER ESOPHAGITIS PRESENT: ICD-10-CM

## 2025-06-12 ENCOUNTER — RESULTS FOLLOW-UP (OUTPATIENT)
Dept: FAMILY MEDICINE | Facility: CLINIC | Age: 55
End: 2025-06-12

## (undated) DEVICE — SOL WATER IRRIG 1000ML BOTTLE 2F7114

## (undated) RX ORDER — FENTANYL CITRATE 50 UG/ML
INJECTION, SOLUTION INTRAMUSCULAR; INTRAVENOUS
Status: DISPENSED
Start: 2022-04-22